# Patient Record
Sex: FEMALE | Race: BLACK OR AFRICAN AMERICAN | NOT HISPANIC OR LATINO | Employment: OTHER | URBAN - METROPOLITAN AREA
[De-identification: names, ages, dates, MRNs, and addresses within clinical notes are randomized per-mention and may not be internally consistent; named-entity substitution may affect disease eponyms.]

---

## 2023-04-10 ENCOUNTER — TELEPHONE (OUTPATIENT)
Dept: PRIMARY CARE | Facility: CLINIC | Age: 68
End: 2023-04-10
Payer: COMMERCIAL

## 2023-04-10 DIAGNOSIS — I10 PRIMARY HYPERTENSION: Primary | ICD-10-CM

## 2023-04-10 RX ORDER — LORATADINE 10 MG/1
1 TABLET ORAL DAILY
COMMUNITY
Start: 2021-02-23 | End: 2023-04-25 | Stop reason: SDUPTHER

## 2023-04-10 RX ORDER — AMITRIPTYLINE HYDROCHLORIDE 25 MG/1
TABLET, FILM COATED ORAL
COMMUNITY
Start: 2016-02-10

## 2023-04-10 RX ORDER — OXYCODONE AND ACETAMINOPHEN 5; 325 MG/1; MG/1
TABLET ORAL
COMMUNITY
Start: 2020-11-17 | End: 2023-04-25 | Stop reason: ALTCHOICE

## 2023-04-10 RX ORDER — DIAZEPAM 5 MG/1
5 TABLET ORAL NIGHTLY PRN
COMMUNITY

## 2023-04-10 RX ORDER — AMLODIPINE BESYLATE 5 MG/1
5 TABLET ORAL DAILY
Qty: 30 TABLET | Refills: 0 | Status: SHIPPED | OUTPATIENT
Start: 2023-04-10 | End: 2023-04-25 | Stop reason: SDUPTHER

## 2023-04-10 RX ORDER — DIAZEPAM 2 MG/1
TABLET ORAL
COMMUNITY
Start: 2014-09-12 | End: 2023-10-10 | Stop reason: SDUPTHER

## 2023-04-10 RX ORDER — ATENOLOL AND CHLORTHALIDONE TABLET 50; 25 MG/1; MG/1
1 TABLET ORAL DAILY
COMMUNITY
Start: 2020-08-03 | End: 2023-04-25 | Stop reason: SDUPTHER

## 2023-04-10 RX ORDER — AMLODIPINE BESYLATE 5 MG/1
1 TABLET ORAL DAILY
COMMUNITY
Start: 2022-04-22 | End: 2023-04-10 | Stop reason: SDUPTHER

## 2023-04-10 RX ORDER — CALCIUM CARBONATE 600 MG
TABLET ORAL
COMMUNITY
End: 2023-10-10 | Stop reason: ALTCHOICE

## 2023-04-10 RX ORDER — MULTIVIT,IRON,MINERALS/LUTEIN
1 TABLET ORAL DAILY
COMMUNITY

## 2023-04-11 LAB
ALANINE AMINOTRANSFERASE (SGPT) (U/L) IN SER/PLAS: 52 U/L (ref 7–45)
ALBUMIN (G/DL) IN SER/PLAS: 4.3 G/DL (ref 3.4–5)
ALKALINE PHOSPHATASE (U/L) IN SER/PLAS: 69 U/L (ref 33–136)
ANION GAP IN SER/PLAS: 14 MMOL/L (ref 10–20)
ASPARTATE AMINOTRANSFERASE (SGOT) (U/L) IN SER/PLAS: 57 U/L (ref 9–39)
BASOPHILS (10*3/UL) IN BLOOD BY AUTOMATED COUNT: 0.03 X10E9/L (ref 0–0.1)
BASOPHILS/100 LEUKOCYTES IN BLOOD BY AUTOMATED COUNT: 1.2 % (ref 0–2)
BILIRUBIN TOTAL (MG/DL) IN SER/PLAS: 0.4 MG/DL (ref 0–1.2)
CALCIUM (MG/DL) IN SER/PLAS: 9.4 MG/DL (ref 8.6–10.6)
CARBON DIOXIDE, TOTAL (MMOL/L) IN SER/PLAS: 30 MMOL/L (ref 21–32)
CHLORIDE (MMOL/L) IN SER/PLAS: 99 MMOL/L (ref 98–107)
CHOLESTEROL (MG/DL) IN SER/PLAS: 249 MG/DL (ref 0–199)
CHOLESTEROL IN HDL (MG/DL) IN SER/PLAS: 105.8 MG/DL
CHOLESTEROL/HDL RATIO: 2.4
CREATININE (MG/DL) IN SER/PLAS: 0.84 MG/DL (ref 0.5–1.05)
EOSINOPHILS (10*3/UL) IN BLOOD BY AUTOMATED COUNT: 0.02 X10E9/L (ref 0–0.7)
EOSINOPHILS/100 LEUKOCYTES IN BLOOD BY AUTOMATED COUNT: 0.8 % (ref 0–6)
ERYTHROCYTE DISTRIBUTION WIDTH (RATIO) BY AUTOMATED COUNT: 12 % (ref 11.5–14.5)
ERYTHROCYTE MEAN CORPUSCULAR HEMOGLOBIN CONCENTRATION (G/DL) BY AUTOMATED: 32.6 G/DL (ref 32–36)
ERYTHROCYTE MEAN CORPUSCULAR VOLUME (FL) BY AUTOMATED COUNT: 98 FL (ref 80–100)
ERYTHROCYTES (10*6/UL) IN BLOOD BY AUTOMATED COUNT: 3.85 X10E12/L (ref 4–5.2)
FERRITIN (UG/LL) IN SER/PLAS: 97 UG/L (ref 8–150)
GFR FEMALE: 75 ML/MIN/1.73M2
GLUCOSE (MG/DL) IN SER/PLAS: 115 MG/DL (ref 74–99)
HEMATOCRIT (%) IN BLOOD BY AUTOMATED COUNT: 37.7 % (ref 36–46)
HEMOGLOBIN (G/DL) IN BLOOD: 12.3 G/DL (ref 12–16)
IMMATURE GRANULOCYTES/100 LEUKOCYTES IN BLOOD BY AUTOMATED COUNT: 0.4 % (ref 0–0.9)
LDL: 128 MG/DL (ref 0–99)
LEUKOCYTES (10*3/UL) IN BLOOD BY AUTOMATED COUNT: 2.5 X10E9/L (ref 4.4–11.3)
LYMPHOCYTES (10*3/UL) IN BLOOD BY AUTOMATED COUNT: 0.81 X10E9/L (ref 1.2–4.8)
LYMPHOCYTES/100 LEUKOCYTES IN BLOOD BY AUTOMATED COUNT: 32.5 % (ref 13–44)
MONOCYTES (10*3/UL) IN BLOOD BY AUTOMATED COUNT: 0.25 X10E9/L (ref 0.1–1)
MONOCYTES/100 LEUKOCYTES IN BLOOD BY AUTOMATED COUNT: 10 % (ref 2–10)
NEUTROPHILS (10*3/UL) IN BLOOD BY AUTOMATED COUNT: 1.37 X10E9/L (ref 1.2–7.7)
NEUTROPHILS/100 LEUKOCYTES IN BLOOD BY AUTOMATED COUNT: 55.1 % (ref 40–80)
NRBC (PER 100 WBCS) BY AUTOMATED COUNT: 0 /100 WBC (ref 0–0)
PLATELETS (10*3/UL) IN BLOOD AUTOMATED COUNT: 314 X10E9/L (ref 150–450)
POTASSIUM (MMOL/L) IN SER/PLAS: 4.4 MMOL/L (ref 3.5–5.3)
PROTEIN TOTAL: 7.2 G/DL (ref 6.4–8.2)
SODIUM (MMOL/L) IN SER/PLAS: 139 MMOL/L (ref 136–145)
TRIGLYCERIDE (MG/DL) IN SER/PLAS: 75 MG/DL (ref 0–149)
UREA NITROGEN (MG/DL) IN SER/PLAS: 16 MG/DL (ref 6–23)
VLDL: 15 MG/DL (ref 0–40)

## 2023-04-12 ENCOUNTER — TELEPHONE (OUTPATIENT)
Dept: PRIMARY CARE | Facility: CLINIC | Age: 68
End: 2023-04-12
Payer: COMMERCIAL

## 2023-04-12 NOTE — TELEPHONE ENCOUNTER
----- Message from Rik Short MD sent at 4/11/2023  6:12 PM EDT -----  Low WBC is within her baseline. Liver enzymes are a little higher, should be avoiding alcohol. Other labs look good.   ----- Message -----  From: [facility] - Lab Results In  Sent: 4/11/2023  12:52 PM EDT  To: Rik Short MD

## 2023-04-25 ENCOUNTER — LAB (OUTPATIENT)
Dept: LAB | Facility: LAB | Age: 68
End: 2023-04-25
Payer: COMMERCIAL

## 2023-04-25 ENCOUNTER — OFFICE VISIT (OUTPATIENT)
Dept: PRIMARY CARE | Facility: CLINIC | Age: 68
End: 2023-04-25
Payer: COMMERCIAL

## 2023-04-25 VITALS
DIASTOLIC BLOOD PRESSURE: 66 MMHG | BODY MASS INDEX: 19.29 KG/M2 | HEART RATE: 76 BPM | SYSTOLIC BLOOD PRESSURE: 118 MMHG | RESPIRATION RATE: 12 BRPM | TEMPERATURE: 97.5 F | WEIGHT: 120 LBS | HEIGHT: 66 IN

## 2023-04-25 DIAGNOSIS — E78.00 ELEVATED LDL CHOLESTEROL LEVEL: ICD-10-CM

## 2023-04-25 DIAGNOSIS — D50.9 IRON DEFICIENCY ANEMIA, UNSPECIFIED IRON DEFICIENCY ANEMIA TYPE: ICD-10-CM

## 2023-04-25 DIAGNOSIS — Z00.00 HEALTHCARE MAINTENANCE: ICD-10-CM

## 2023-04-25 DIAGNOSIS — D72.819 LEUKOPENIA, UNSPECIFIED TYPE: ICD-10-CM

## 2023-04-25 DIAGNOSIS — M79.672 BILATERAL FOOT PAIN: ICD-10-CM

## 2023-04-25 DIAGNOSIS — F33.41 RECURRENT MAJOR DEPRESSIVE DISORDER, IN PARTIAL REMISSION (CMS-HCC): ICD-10-CM

## 2023-04-25 DIAGNOSIS — J30.9 ALLERGIC RHINITIS, UNSPECIFIED SEASONALITY, UNSPECIFIED TRIGGER: ICD-10-CM

## 2023-04-25 DIAGNOSIS — I51.9 DIASTOLIC DYSFUNCTION, LEFT VENTRICLE: ICD-10-CM

## 2023-04-25 DIAGNOSIS — R74.01 ELEVATED AST (SGOT): ICD-10-CM

## 2023-04-25 DIAGNOSIS — M79.671 BILATERAL FOOT PAIN: ICD-10-CM

## 2023-04-25 DIAGNOSIS — I10 PRIMARY HYPERTENSION: Primary | ICD-10-CM

## 2023-04-25 PROBLEM — M25.551 HIP PAIN, CHRONIC, RIGHT: Status: ACTIVE | Noted: 2023-04-25

## 2023-04-25 PROBLEM — L50.8 ACUTE URTICARIA: Status: ACTIVE | Noted: 2023-04-25

## 2023-04-25 PROBLEM — L50.8 ACUTE URTICARIA: Status: RESOLVED | Noted: 2023-04-25 | Resolved: 2023-04-25

## 2023-04-25 PROBLEM — G89.29 CHRONIC PAIN OF BOTH KNEES: Status: ACTIVE | Noted: 2023-04-25

## 2023-04-25 PROBLEM — G89.29 HIP PAIN, CHRONIC, RIGHT: Status: ACTIVE | Noted: 2023-04-25

## 2023-04-25 PROBLEM — H10.30 ACUTE CONJUNCTIVITIS: Status: ACTIVE | Noted: 2023-04-25

## 2023-04-25 PROBLEM — M25.562 CHRONIC PAIN OF BOTH KNEES: Status: ACTIVE | Noted: 2023-04-25

## 2023-04-25 PROBLEM — M85.852 OSTEOPENIA OF LEFT HIP: Status: ACTIVE | Noted: 2023-04-25

## 2023-04-25 PROBLEM — G47.00 INSOMNIA: Status: ACTIVE | Noted: 2023-04-25

## 2023-04-25 PROBLEM — Z96.643 AFTERCARE FOLLOWING BILATERAL HIP JOINT REPLACEMENT SURGERY: Status: ACTIVE | Noted: 2020-11-17

## 2023-04-25 PROBLEM — M15.4 EROSIVE OSTEOARTHRITIS OF HAND: Status: ACTIVE | Noted: 2023-04-25

## 2023-04-25 PROBLEM — J06.9 ACUTE URI: Status: RESOLVED | Noted: 2023-04-25 | Resolved: 2023-04-25

## 2023-04-25 PROBLEM — Z47.1 AFTERCARE FOLLOWING BILATERAL HIP JOINT REPLACEMENT SURGERY: Status: ACTIVE | Noted: 2020-11-17

## 2023-04-25 PROBLEM — M25.561 CHRONIC PAIN OF BOTH KNEES: Status: ACTIVE | Noted: 2023-04-25

## 2023-04-25 PROBLEM — D64.9 ANEMIA: Status: ACTIVE | Noted: 2020-01-01

## 2023-04-25 PROBLEM — B37.9 YEAST INFECTION: Status: RESOLVED | Noted: 2023-04-25 | Resolved: 2023-04-25

## 2023-04-25 PROBLEM — B37.9 YEAST INFECTION: Status: ACTIVE | Noted: 2023-04-25

## 2023-04-25 PROBLEM — F98.8 ADD (ATTENTION DEFICIT DISORDER): Status: ACTIVE | Noted: 2023-04-25

## 2023-04-25 PROBLEM — M25.512 CHRONIC LEFT SHOULDER PAIN: Status: ACTIVE | Noted: 2023-04-25

## 2023-04-25 PROBLEM — G89.29 CHRONIC LEFT SHOULDER PAIN: Status: ACTIVE | Noted: 2023-04-25

## 2023-04-25 PROBLEM — Z96.60 HISTORY OF JOINT REPLACEMENT: Status: ACTIVE | Noted: 2023-04-25

## 2023-04-25 PROBLEM — J06.9 ACUTE URI: Status: ACTIVE | Noted: 2023-04-25

## 2023-04-25 PROBLEM — I83.93 VARICOSE VEINS OF BOTH LOWER EXTREMITIES: Status: ACTIVE | Noted: 2020-01-01

## 2023-04-25 PROBLEM — H10.30 ACUTE CONJUNCTIVITIS: Status: RESOLVED | Noted: 2023-04-25 | Resolved: 2023-04-25

## 2023-04-25 PROBLEM — K76.9 HEPATIC LESION: Status: ACTIVE | Noted: 2023-04-25

## 2023-04-25 PROBLEM — F33.9 MAJOR DEPRESSION, RECURRENT (CMS-HCC): Status: ACTIVE | Noted: 2020-01-01

## 2023-04-25 LAB
HEPATITIS A VIRUS IGM AB PRESENCE IN SER/PLAS BY IMMUNOASSAY: NONREACTIVE
HEPATITIS B VIRUS CORE IGM AB PRESENCE IN SER/PLAS BY IMMUNOASSY: NONREACTIVE
HEPATITIS B VIRUS SURFACE AG PRESENCE IN SERUM: NONREACTIVE
HEPATITIS C VIRUS AB PRESENCE IN SERUM: NONREACTIVE

## 2023-04-25 PROCEDURE — 99214 OFFICE O/P EST MOD 30 MIN: CPT | Performed by: FAMILY MEDICINE

## 2023-04-25 PROCEDURE — 3078F DIAST BP <80 MM HG: CPT | Performed by: FAMILY MEDICINE

## 2023-04-25 PROCEDURE — 80074 ACUTE HEPATITIS PANEL: CPT

## 2023-04-25 PROCEDURE — 3074F SYST BP LT 130 MM HG: CPT | Performed by: FAMILY MEDICINE

## 2023-04-25 PROCEDURE — 1036F TOBACCO NON-USER: CPT | Performed by: FAMILY MEDICINE

## 2023-04-25 PROCEDURE — 36415 COLL VENOUS BLD VENIPUNCTURE: CPT

## 2023-04-25 PROCEDURE — 1159F MED LIST DOCD IN RCRD: CPT | Performed by: FAMILY MEDICINE

## 2023-04-25 RX ORDER — AMLODIPINE BESYLATE 5 MG/1
5 TABLET ORAL DAILY
Qty: 90 TABLET | Refills: 1 | Status: SHIPPED | OUTPATIENT
Start: 2023-04-25 | End: 2023-05-15 | Stop reason: SDUPTHER

## 2023-04-25 RX ORDER — FERROUS SULFATE 325(65) MG
65 TABLET ORAL EVERY OTHER DAY
COMMUNITY

## 2023-04-25 RX ORDER — LORATADINE 10 MG/1
10 TABLET ORAL DAILY
Qty: 90 TABLET | Refills: 1 | Status: SHIPPED | OUTPATIENT
Start: 2023-04-25 | End: 2024-06-03 | Stop reason: SDUPTHER

## 2023-04-25 RX ORDER — METHYLPHENIDATE HYDROCHLORIDE 20 MG/1
20 TABLET ORAL 3 TIMES DAILY
COMMUNITY
Start: 2020-09-01

## 2023-04-25 RX ORDER — ATENOLOL AND CHLORTHALIDONE TABLET 50; 25 MG/1; MG/1
1 TABLET ORAL DAILY
Qty: 90 TABLET | Refills: 1 | Status: SHIPPED | OUTPATIENT
Start: 2023-04-25 | End: 2023-10-10 | Stop reason: SDUPTHER

## 2023-04-25 RX ORDER — DEXTROMETHORPHAN HYDROBROMIDE, GUAIFENESIN 5; 100 MG/5ML; MG/5ML
650 LIQUID ORAL EVERY 8 HOURS PRN
COMMUNITY

## 2023-04-25 ASSESSMENT — ENCOUNTER SYMPTOMS
ABDOMINAL PAIN: 0
LIGHT-HEADEDNESS: 0
CHEST TIGHTNESS: 0
CHILLS: 0
HEADACHES: 0
DYSURIA: 0
DYSPHORIC MOOD: 0
DIARRHEA: 0
SLEEP DISTURBANCE: 0
ARTHRALGIAS: 1
WHEEZING: 0
DIFFICULTY URINATING: 0
NAUSEA: 0
SHORTNESS OF BREATH: 0
MYALGIAS: 0
ADENOPATHY: 0
ABDOMINAL DISTENTION: 0
BRUISES/BLEEDS EASILY: 0
FEVER: 0
FATIGUE: 0
APPETITE CHANGE: 0
LOSS OF SENSATION IN FEET: 0
DEPRESSION: 1
OCCASIONAL FEELINGS OF UNSTEADINESS: 0
NERVOUS/ANXIOUS: 1
SINUS PRESSURE: 0

## 2023-04-25 ASSESSMENT — PATIENT HEALTH QUESTIONNAIRE - PHQ9
SUM OF ALL RESPONSES TO PHQ9 QUESTIONS 1 AND 2: 1
10. IF YOU CHECKED OFF ANY PROBLEMS, HOW DIFFICULT HAVE THESE PROBLEMS MADE IT FOR YOU TO DO YOUR WORK, TAKE CARE OF THINGS AT HOME, OR GET ALONG WITH OTHER PEOPLE: SOMEWHAT DIFFICULT
1. LITTLE INTEREST OR PLEASURE IN DOING THINGS: NOT AT ALL
2. FEELING DOWN, DEPRESSED OR HOPELESS: SEVERAL DAYS

## 2023-04-25 NOTE — PROGRESS NOTES
Subjective   Patient ID: Valeria Watkins is a 68 y.o. female who presents for Follow-up.  Pt has chronic HTN, diastolic dysfunction.  Pt is taking Amlo, [place]/Chlorthalidone. Tolerating well.  Exercising 5-7 days per week   Low sodium diet is usually being followed.   Is not monitoring home blood pressures.  Denies HA, vision changes or CP.     Pt has Dyslipidemia.   Lipid panel showed , persistently elevated.  Currently taking no medication.     Labs showed lower WBC at 2.5, previously in 3-4 range. Was seeing hematology until 2020 then hasn't followed up since. Iron deficiency anemia is stable with oral iron supp every other day.     For diffuse OA, arthralgias are stable with Tylenol 2-3x daily.    Pt has chronic and stable depression / anxiety.  Pt  has supportive family/friends.   Pt not seeing a counselor.   Taking Diazepam, Concerta and Elavil and it is helping.   Mood, energy, motivation, interests, sleep and appetite are adequate. Anxiety is stable.  Pt denies suicidal ideations.      For chronic and worsening b/l foot pain and calluses she is requesting referral to podiatry.          Review of Systems   Constitutional:  Negative for appetite change, chills, fatigue and fever.   HENT:  Negative for congestion, ear pain and sinus pressure.    Eyes:  Negative for visual disturbance.   Respiratory:  Negative for chest tightness, shortness of breath and wheezing.    Cardiovascular:  Negative for chest pain.   Gastrointestinal:  Negative for abdominal distention, abdominal pain, diarrhea and nausea.   Genitourinary:  Negative for difficulty urinating, dysuria and pelvic pain.   Musculoskeletal:  Positive for arthralgias. Negative for myalgias.   Skin:  Negative for rash.   Allergic/Immunologic: Negative for immunocompromised state.   Neurological:  Negative for light-headedness and headaches.   Hematological:  Negative for adenopathy. Does not bruise/bleed easily.   Psychiatric/Behavioral:  Negative for  "dysphoric mood and sleep disturbance. The patient is nervous/anxious.        Objective   /66   Pulse 76   Temp 36.4 °C (97.5 °F)   Resp 12   Ht 1.676 m (5' 6\")   Wt 54.4 kg (120 lb)   BMI 19.37 kg/m²    Physical Exam  Constitutional:       General: She is not in acute distress.     Appearance: Normal appearance.   Cardiovascular:      Rate and Rhythm: Normal rate and regular rhythm.      Heart sounds: Normal heart sounds. No murmur heard.  Pulmonary:      Effort: Pulmonary effort is normal.      Breath sounds: Normal breath sounds.   Abdominal:      Palpations: Abdomen is soft.      Tenderness: There is no abdominal tenderness.   Neurological:      Mental Status: She is alert.   Psychiatric:         Mood and Affect: Mood normal.         Judgment: Judgment normal.           Assessment/Plan   Diagnoses and all orders for this visit:  Primary hypertension / Diastolic dysfunction, left ventricle- stable, continue current meds and monitor  Elevated AST (SGOT) - recommend avoid alcohol, keep Tylenol to lowest effective dose. Will check hepatitis panel with labs.  Elevated LDL cholesterol level - will check CAC score and monitor  Leukopenia, unspecified type - WBC lower, referring back to Dr Maldonado  Iron deficiency anemia, unspecified iron deficiency anemia type - stable, continue iron at current dose  Recurrent major depressive disorder, in partial remission (CMS/HCC) -stable, continue to follow with Dr Siu  Foot pain - referring to Podiatry  Follow up in 6 months, 30min for preventative     "

## 2023-04-25 NOTE — PROGRESS NOTES
"Subjective   Patient ID: Valeria Watkins is a 68 y.o. female who presents for Follow-up.    HPI     Review of Systems    Objective   /66   Pulse 76   Temp 36.4 °C (97.5 °F)   Resp 12   Ht 1.676 m (5' 6\")   Wt 54.4 kg (120 lb)   BMI 19.37 kg/m²     Physical Exam    Assessment/Plan          "

## 2023-04-26 ENCOUNTER — TELEPHONE (OUTPATIENT)
Dept: PRIMARY CARE | Facility: CLINIC | Age: 68
End: 2023-04-26
Payer: COMMERCIAL

## 2023-04-26 NOTE — TELEPHONE ENCOUNTER
----- Message from Rik Short MD sent at 4/25/2023  5:18 PM EDT -----  Hepatitis screen is negative  ----- Message -----  From: Lab, Background User  Sent: 4/25/2023   4:39 PM EDT  To: Rik Short MD

## 2023-04-27 DIAGNOSIS — E78.00 ELEVATED LDL CHOLESTEROL LEVEL: ICD-10-CM

## 2023-04-27 DIAGNOSIS — I25.10 CORONARY ARTERY DISEASE INVOLVING NATIVE CORONARY ARTERY OF NATIVE HEART WITHOUT ANGINA PECTORIS: Primary | ICD-10-CM

## 2023-04-27 RX ORDER — ASPIRIN 81 MG/1
81 TABLET ORAL DAILY
Qty: 90 TABLET | Refills: 3 | Status: SHIPPED | OUTPATIENT
Start: 2023-04-27 | End: 2024-04-26

## 2023-04-27 RX ORDER — ROSUVASTATIN CALCIUM 10 MG/1
10 TABLET, COATED ORAL DAILY
Qty: 90 TABLET | Refills: 1 | Status: SHIPPED | OUTPATIENT
Start: 2023-04-27 | End: 2023-10-10 | Stop reason: SDUPTHER

## 2023-04-28 ENCOUNTER — TELEPHONE (OUTPATIENT)
Dept: PRIMARY CARE | Facility: CLINIC | Age: 68
End: 2023-04-28
Payer: COMMERCIAL

## 2023-04-28 NOTE — TELEPHONE ENCOUNTER
----- Message from Rik Short MD sent at 4/27/2023  9:06 PM EDT -----  Coronary artery calcium score is high which does indicate a higher risk of CV disease (heart attack, stroke). Recommend healthy diet, regular exercise and maintaining healthy blood pressure and cholesterol levels. Additionally will have her start Aspirin 81mg once daily and Rosuvastatin 10mg once daily ( preferrably in the evening). I she develops any symptoms of CP or SOB would consider referral to establish with cardiology.  ----- Message -----  From: [facility] - Radiology Results In  Sent: 4/27/2023   9:24 AM EDT  To: Rik Short MD

## 2023-05-15 DIAGNOSIS — I10 PRIMARY HYPERTENSION: ICD-10-CM

## 2023-05-16 RX ORDER — AMLODIPINE BESYLATE 5 MG/1
5 TABLET ORAL DAILY
Qty: 90 TABLET | Refills: 1 | Status: SHIPPED | OUTPATIENT
Start: 2023-05-16 | End: 2023-10-10 | Stop reason: SDUPTHER

## 2023-05-18 ENCOUNTER — APPOINTMENT (OUTPATIENT)
Dept: PRIMARY CARE | Facility: CLINIC | Age: 68
End: 2023-05-18
Payer: COMMERCIAL

## 2023-09-01 PROBLEM — G89.29 CHRONIC FOOT PAIN, LEFT: Status: ACTIVE | Noted: 2023-09-01

## 2023-09-01 PROBLEM — M79.672 CHRONIC FOOT PAIN, LEFT: Status: ACTIVE | Noted: 2023-09-01

## 2023-09-01 PROBLEM — L30.9 DERMATITIS, UNSPECIFIED: Status: ACTIVE | Noted: 2020-02-05

## 2023-09-01 PROBLEM — M21.41 ACQUIRED PES PLANUS OF BOTH FEET: Status: ACTIVE | Noted: 2023-09-01

## 2023-09-01 PROBLEM — M20.10 HALLUX ABDUCTOVALGUS WITH BUNIONS: Status: ACTIVE | Noted: 2023-09-01

## 2023-09-01 PROBLEM — M21.619 HALLUX ABDUCTOVALGUS WITH BUNIONS: Status: ACTIVE | Noted: 2023-09-01

## 2023-09-01 PROBLEM — L85.9 EPIDERMAL THICKENING, UNSPECIFIED: Status: ACTIVE | Noted: 2020-02-05

## 2023-09-01 PROBLEM — M21.42 ACQUIRED PES PLANUS OF BOTH FEET: Status: ACTIVE | Noted: 2023-09-01

## 2023-09-01 RX ORDER — FLUOXETINE HYDROCHLORIDE 20 MG/1
20 CAPSULE ORAL
COMMUNITY
End: 2023-10-10 | Stop reason: ALTCHOICE

## 2023-09-01 RX ORDER — CHLORHEXIDINE GLUCONATE ORAL RINSE 1.2 MG/ML
SOLUTION DENTAL
COMMUNITY
Start: 2016-11-15 | End: 2023-10-10 | Stop reason: ALTCHOICE

## 2023-09-01 RX ORDER — OLANZAPINE 10 MG/1
1 TABLET ORAL NIGHTLY
COMMUNITY
Start: 2016-04-04 | End: 2023-10-10 | Stop reason: ALTCHOICE

## 2023-09-01 RX ORDER — OXYCODONE AND ACETAMINOPHEN 10; 325 MG/1; MG/1
1 TABLET ORAL EVERY 6 HOURS PRN
COMMUNITY
Start: 2015-11-30 | End: 2023-10-10 | Stop reason: ALTCHOICE

## 2023-09-01 RX ORDER — TRIAMCINOLONE ACETONIDE 1 MG/G
CREAM TOPICAL 2 TIMES DAILY
COMMUNITY
Start: 2014-09-12 | End: 2023-10-10 | Stop reason: ALTCHOICE

## 2023-09-01 RX ORDER — LANOLIN ALCOHOL/MO/W.PET/CERES
100 CREAM (GRAM) TOPICAL 3 TIMES DAILY
COMMUNITY
Start: 2016-04-05 | End: 2023-10-10 | Stop reason: ALTCHOICE

## 2023-09-01 RX ORDER — CYCLOBENZAPRINE HCL 10 MG
10 TABLET ORAL 3 TIMES DAILY PRN
COMMUNITY
End: 2023-10-10 | Stop reason: ALTCHOICE

## 2023-09-01 RX ORDER — FLUOXETINE HYDROCHLORIDE 40 MG/1
40 CAPSULE ORAL
COMMUNITY
Start: 2016-04-05 | End: 2023-10-10 | Stop reason: ALTCHOICE

## 2023-09-01 RX ORDER — HYDROCHLOROTHIAZIDE 25 MG/1
25 TABLET ORAL
COMMUNITY
Start: 2016-04-05 | End: 2023-10-10 | Stop reason: ALTCHOICE

## 2023-09-01 RX ORDER — OXYCODONE AND ACETAMINOPHEN 5; 325 MG/1; MG/1
1 TABLET ORAL EVERY 6 HOURS
COMMUNITY
Start: 2020-11-18 | End: 2023-10-10 | Stop reason: ALTCHOICE

## 2023-09-01 RX ORDER — IBUPROFEN 600 MG/1
600 TABLET ORAL EVERY 6 HOURS PRN
COMMUNITY
Start: 2015-11-30 | End: 2023-10-10 | Stop reason: ALTCHOICE

## 2023-09-01 RX ORDER — NAPROXEN SODIUM 550 MG/1
550 TABLET ORAL EVERY 12 HOURS PRN
COMMUNITY
Start: 2015-12-29 | End: 2023-10-10 | Stop reason: ALTCHOICE

## 2023-10-02 ASSESSMENT — PATIENT HEALTH QUESTIONNAIRE - PHQ9
3. TROUBLE FALLING OR STAYING ASLEEP OR SLEEPING TOO MUCH: NOT AT ALL
2. FEELING DOWN, DEPRESSED, IRRITABLE, OR HOPELESS: 1
7. TROUBLE CONCENTRATING ON THINGS, SUCH AS READING THE NEWSPAPER OR WATCHING TELEVISION: NOT AT ALL
6. FEELING BAD ABOUT YOURSELF - OR THAT YOU ARE A FAILURE OR HAVE LET YOURSELF OR YOUR FAMILY DOWN: SEVERAL DAYS
6. FEELING BAD ABOUT YOURSELF - OR THAT YOU ARE A FAILURE OR HAVE LET YOURSELF OR YOUR FAMILY DOWN: 1
3. TROUBLE FALLING OR STAYING ASLEEP OR SLEEPING TOO MUCH: 0
9. THOUGHTS THAT YOU WOULD BE BETTER OFF DEAD, OR OF HURTING YOURSELF: 0
5. POOR APPETITE OR OVEREATING: SEVERAL DAYS
8. MOVING OR SPEAKING SO SLOWLY THAT OTHER PEOPLE COULD HAVE NOTICED. OR THE OPPOSITE, BEING SO FIGETY OR RESTLESS THAT YOU HAVE BEEN MOVING AROUND A LOT MORE THAN USUAL: 0
1. LITTLE INTEREST OR PLEASURE IN DOING THINGS: NOT AT ALL
SUM OF ALL RESPONSES TO PHQ QUESTIONS 1-9: 4
SUM OF ALL RESPONSES TO PHQ QUESTIONS 1-9: 4
3. TROUBLE FALLING OR STAYING ASLEEP OR SLEEPING TOO MUCH: NOT AT ALL
7. TROUBLE CONCENTRATING ON THINGS, SUCH AS READING THE NEWSPAPER OR WATCHING TELEVISION: 0
9. THOUGHTS THAT YOU WOULD BE BETTER OFF DEAD, OR OF HURTING YOURSELF: NOT AT ALL
2. FEELING DOWN, DEPRESSED, IRRITABLE, OR HOPELESS: SEVERAL DAYS
2. FEELING DOWN, DEPRESSED OR HOPELESS: SEVERAL DAYS
4. FEELING TIRED OR HAVING LITTLE ENERGY: 1
5. POOR APPETITE OR OVEREATING: 1
1. LITTLE INTEREST OR PLEASURE IN DOING THINGS: NOT AT ALL
10. IF YOU CHECKED OFF ANY PROBLEMS, HOW DIFFICULT HAVE THESE PROBLEMS MADE IT FOR YOU TO DO YOUR WORK, TAKE CARE OF THINGS AT HOME, OR GET ALONG WITH OTHER PEOPLE: NOT DIFFICULT AT ALL
8. MOVING OR SPEAKING SO SLOWLY THAT OTHER PEOPLE COULD HAVE NOTICED. OR THE OPPOSITE, BEING SO FIGETY OR RESTLESS THAT YOU HAVE BEEN MOVING AROUND A LOT MORE THAN USUAL: NOT AT ALL
5. POOR APPETITE OR OVEREATING: SEVERAL DAYS
7. TROUBLE CONCENTRATING ON THINGS, SUCH AS READING THE NEWSPAPER OR WATCHING TELEVISION: NOT AT ALL
4. FEELING TIRED OR HAVING LITTLE ENERGY: SEVERAL DAYS
6. FEELING BAD ABOUT YOURSELF - OR THAT YOU ARE A FAILURE OR HAVE LET YOURSELF OR YOUR FAMILY DOWN: SEVERAL DAYS
9. THOUGHTS THAT YOU WOULD BE BETTER OFF DEAD, OR OF HURTING YOURSELF: NOT AT ALL
1. LITTLE INTEREST OR PLEASURE IN DOING THINGS: 0
10. IF YOU CHECKED OFF ANY PROBLEMS, HOW DIFFICULT HAVE THESE PROBLEMS MADE IT FOR YOU TO DO YOUR WORK, TAKE CARE OF THINGS AT HOME, OR GET ALONG WITH OTHER PEOPLE: NOT DIFFICULT AT ALL
4. FEELING TIRED OR HAVING LITTLE ENERGY: SEVERAL DAYS
8. MOVING OR SPEAKING SO SLOWLY THAT OTHER PEOPLE COULD HAVE NOTICED. OR THE OPPOSITE, BEING SO FIGETY OR RESTLESS THAT YOU HAVE BEEN MOVING AROUND A LOT MORE THAN USUAL: NOT AT ALL

## 2023-10-06 ENCOUNTER — TELEPHONE (OUTPATIENT)
Dept: PRIMARY CARE | Facility: CLINIC | Age: 68
End: 2023-10-06
Payer: COMMERCIAL

## 2023-10-06 ENCOUNTER — LAB (OUTPATIENT)
Dept: LAB | Facility: LAB | Age: 68
End: 2023-10-06
Payer: COMMERCIAL

## 2023-10-06 DIAGNOSIS — R79.89 ELEVATED LFTS: ICD-10-CM

## 2023-10-06 DIAGNOSIS — E78.00 ELEVATED LDL CHOLESTEROL LEVEL: ICD-10-CM

## 2023-10-06 DIAGNOSIS — I10 PRIMARY HYPERTENSION: ICD-10-CM

## 2023-10-06 DIAGNOSIS — E78.00 PURE HYPERCHOLESTEROLEMIA, UNSPECIFIED: ICD-10-CM

## 2023-10-06 DIAGNOSIS — Z00.00 HEALTHCARE MAINTENANCE: ICD-10-CM

## 2023-10-06 DIAGNOSIS — R73.01 IFG (IMPAIRED FASTING GLUCOSE): ICD-10-CM

## 2023-10-06 DIAGNOSIS — D50.9 IRON DEFICIENCY ANEMIA, UNSPECIFIED IRON DEFICIENCY ANEMIA TYPE: ICD-10-CM

## 2023-10-06 DIAGNOSIS — I10 PRIMARY HYPERTENSION: Primary | ICD-10-CM

## 2023-10-06 LAB
ALBUMIN SERPL BCP-MCNC: 4.6 G/DL (ref 3.4–5)
ALP SERPL-CCNC: 89 U/L (ref 33–136)
ALT SERPL W P-5'-P-CCNC: 71 U/L (ref 7–45)
ANION GAP SERPL CALC-SCNC: 16 MMOL/L (ref 10–20)
APPEARANCE UR: CLEAR
AST SERPL W P-5'-P-CCNC: 74 U/L (ref 9–39)
BILIRUB SERPL-MCNC: 0.5 MG/DL (ref 0–1.2)
BILIRUB UR STRIP.AUTO-MCNC: NEGATIVE MG/DL
BUN SERPL-MCNC: 8 MG/DL (ref 6–23)
CALCIUM SERPL-MCNC: 11 MG/DL (ref 8.6–10.6)
CHLORIDE SERPL-SCNC: 100 MMOL/L (ref 98–107)
CHOLEST SERPL-MCNC: 197 MG/DL (ref 0–199)
CHOLESTEROL/HDL RATIO: 2.2
CO2 SERPL-SCNC: 31 MMOL/L (ref 21–32)
COLOR UR: YELLOW
CREAT SERPL-MCNC: 0.73 MG/DL (ref 0.5–1.05)
ERYTHROCYTE [DISTWIDTH] IN BLOOD BY AUTOMATED COUNT: 11.6 % (ref 11.5–14.5)
EST. AVERAGE GLUCOSE BLD GHB EST-MCNC: 105 MG/DL
FERRITIN SERPL-MCNC: 130 NG/ML (ref 8–150)
FOLATE SERPL-MCNC: >24 NG/ML
GFR SERPL CREATININE-BSD FRML MDRD: 89 ML/MIN/1.73M*2
GGT SERPL-CCNC: 85 U/L (ref 5–55)
GLUCOSE SERPL-MCNC: 82 MG/DL (ref 74–99)
GLUCOSE UR STRIP.AUTO-MCNC: NEGATIVE MG/DL
HBA1C MFR BLD: 5.3 %
HCT VFR BLD AUTO: 39.5 % (ref 36–46)
HCV AB SER QL: NONREACTIVE
HDLC SERPL-MCNC: 89.4 MG/DL
HGB BLD-MCNC: 12.6 G/DL (ref 12–16)
IRON SATN MFR SERPL: 10 % (ref 25–45)
IRON SERPL-MCNC: 40 UG/DL (ref 35–150)
KETONES UR STRIP.AUTO-MCNC: NEGATIVE MG/DL
LDLC SERPL CALC-MCNC: 89 MG/DL (ref 140–190)
LEUKOCYTE ESTERASE UR QL STRIP.AUTO: NEGATIVE
MCH RBC QN AUTO: 31.4 PG (ref 26–34)
MCHC RBC AUTO-ENTMCNC: 31.9 G/DL (ref 32–36)
MCV RBC AUTO: 99 FL (ref 80–100)
NITRITE UR QL STRIP.AUTO: NEGATIVE
NON HDL CHOLESTEROL: 108 MG/DL (ref 0–149)
NRBC BLD-RTO: 0 /100 WBCS (ref 0–0)
PH UR STRIP.AUTO: 8 [PH]
PLATELET # BLD AUTO: 344 X10*3/UL (ref 150–450)
PMV BLD AUTO: 10 FL (ref 7.5–11.5)
POTASSIUM SERPL-SCNC: 3.8 MMOL/L (ref 3.5–5.3)
PROT SERPL-MCNC: 7.4 G/DL (ref 6.4–8.2)
PROT UR STRIP.AUTO-MCNC: NEGATIVE MG/DL
RBC # BLD AUTO: 4.01 X10*6/UL (ref 4–5.2)
RBC # UR STRIP.AUTO: NEGATIVE /UL
SODIUM SERPL-SCNC: 143 MMOL/L (ref 136–145)
SP GR UR STRIP.AUTO: 1
TIBC SERPL-MCNC: 399 UG/DL (ref 240–445)
TRIGL SERPL-MCNC: 92 MG/DL (ref 0–149)
UIBC SERPL-MCNC: 359 UG/DL (ref 110–370)
UROBILINOGEN UR STRIP.AUTO-MCNC: <2 MG/DL
VIT B12 SERPL-MCNC: 1323 PG/ML (ref 211–911)
VLDL: 18 MG/DL (ref 0–40)
WBC # BLD AUTO: 3.7 X10*3/UL (ref 4.4–11.3)

## 2023-10-06 PROCEDURE — 80053 COMPREHEN METABOLIC PANEL: CPT

## 2023-10-06 PROCEDURE — 83036 HEMOGLOBIN GLYCOSYLATED A1C: CPT

## 2023-10-06 PROCEDURE — 82607 VITAMIN B-12: CPT

## 2023-10-06 PROCEDURE — 80061 LIPID PANEL: CPT

## 2023-10-06 PROCEDURE — 82977 ASSAY OF GGT: CPT

## 2023-10-06 PROCEDURE — 86803 HEPATITIS C AB TEST: CPT

## 2023-10-06 PROCEDURE — 85027 COMPLETE CBC AUTOMATED: CPT

## 2023-10-06 PROCEDURE — 83540 ASSAY OF IRON: CPT

## 2023-10-06 PROCEDURE — 81003 URINALYSIS AUTO W/O SCOPE: CPT

## 2023-10-06 PROCEDURE — 82746 ASSAY OF FOLIC ACID SERUM: CPT

## 2023-10-06 PROCEDURE — 83550 IRON BINDING TEST: CPT

## 2023-10-06 PROCEDURE — 36415 COLL VENOUS BLD VENIPUNCTURE: CPT

## 2023-10-06 PROCEDURE — 82728 ASSAY OF FERRITIN: CPT

## 2023-10-06 NOTE — TELEPHONE ENCOUNTER
Patient came into office, wants blood work order for upcoming office visit on 10/10/2023.  Patient wants to wait for order as she has already fasted.

## 2023-10-09 ENCOUNTER — OFFICE VISIT (OUTPATIENT)
Dept: HEMATOLOGY/ONCOLOGY | Facility: CLINIC | Age: 68
End: 2023-10-09
Payer: COMMERCIAL

## 2023-10-09 VITALS
WEIGHT: 120.15 LBS | BODY MASS INDEX: 20.51 KG/M2 | OXYGEN SATURATION: 100 % | DIASTOLIC BLOOD PRESSURE: 84 MMHG | HEART RATE: 83 BPM | SYSTOLIC BLOOD PRESSURE: 166 MMHG | RESPIRATION RATE: 18 BRPM | HEIGHT: 64 IN | TEMPERATURE: 97.5 F

## 2023-10-09 DIAGNOSIS — D70.8 OTHER NEUTROPENIA (CMS-HCC): Primary | ICD-10-CM

## 2023-10-09 LAB
BASOPHILS # BLD AUTO: 0.02 X10*3/UL (ref 0–0.1)
BASOPHILS NFR BLD AUTO: 0.7 %
EOSINOPHIL # BLD AUTO: 0.21 X10*3/UL (ref 0–0.7)
EOSINOPHIL NFR BLD AUTO: 7.4 %
ERYTHROCYTE [DISTWIDTH] IN BLOOD BY AUTOMATED COUNT: 11.4 % (ref 11.5–14.5)
HCT VFR BLD AUTO: 38.1 % (ref 36–46)
HGB BLD-MCNC: 12.6 G/DL (ref 12–16)
IMM GRANULOCYTES # BLD AUTO: 0 X10*3/UL (ref 0–0.7)
IMM GRANULOCYTES NFR BLD AUTO: 0 % (ref 0–0.9)
LYMPHOCYTES # BLD AUTO: 1.08 X10*3/UL (ref 1.2–4.8)
LYMPHOCYTES NFR BLD AUTO: 38 %
MCH RBC QN AUTO: 33.1 PG (ref 26–34)
MCHC RBC AUTO-ENTMCNC: 33.1 G/DL (ref 32–36)
MCV RBC AUTO: 100 FL (ref 80–100)
MONOCYTES # BLD AUTO: 0.27 X10*3/UL (ref 0.1–1)
MONOCYTES NFR BLD AUTO: 9.5 %
NEUTROPHILS # BLD AUTO: 1.26 X10*3/UL (ref 1.2–7.7)
NEUTROPHILS NFR BLD AUTO: 44.4 %
PLATELET # BLD AUTO: 288 X10*3/UL (ref 150–450)
PMV BLD AUTO: 8.9 FL (ref 7.5–11.5)
RBC # BLD AUTO: 3.81 X10*6/UL (ref 4–5.2)
WBC # BLD AUTO: 2.8 X10*3/UL (ref 4.4–11.3)

## 2023-10-09 PROCEDURE — 1036F TOBACCO NON-USER: CPT | Performed by: INTERNAL MEDICINE

## 2023-10-09 PROCEDURE — 85025 COMPLETE CBC W/AUTO DIFF WBC: CPT | Performed by: INTERNAL MEDICINE

## 2023-10-09 PROCEDURE — 1159F MED LIST DOCD IN RCRD: CPT | Performed by: INTERNAL MEDICINE

## 2023-10-09 PROCEDURE — 3079F DIAST BP 80-89 MM HG: CPT | Performed by: INTERNAL MEDICINE

## 2023-10-09 PROCEDURE — 99203 OFFICE O/P NEW LOW 30 MIN: CPT | Performed by: INTERNAL MEDICINE

## 2023-10-09 PROCEDURE — 1125F AMNT PAIN NOTED PAIN PRSNT: CPT | Performed by: INTERNAL MEDICINE

## 2023-10-09 PROCEDURE — 99213 OFFICE O/P EST LOW 20 MIN: CPT | Mod: 25 | Performed by: INTERNAL MEDICINE

## 2023-10-09 PROCEDURE — 3077F SYST BP >= 140 MM HG: CPT | Performed by: INTERNAL MEDICINE

## 2023-10-09 PROCEDURE — 36415 COLL VENOUS BLD VENIPUNCTURE: CPT | Performed by: INTERNAL MEDICINE

## 2023-10-09 ASSESSMENT — PAIN SCALES - GENERAL: PAINLEVEL: 7

## 2023-10-09 NOTE — PROGRESS NOTES
Patient Visit Information:   Visit Type: Follow Up Visit      History of Present Illness:      Chief Complaint: Normocytic anemia and  leukopenia   Interval History:    Patient is a 68-year-old female with a history of anemia, leukopenia, anxiety, depression,  ADD, hypertension, bilateral hip pain and right hip replacement, multiple  joint pain was complaining of weakness and fatigue.     Recent CBC done in October 2018 did show WBC count 2.3, RBC 3.6, hemoglobin 11.1, platelets 350.         In September 2015 WBC count of 3.5, hemoglobin 11.6, platelets 376 with the 31% neutrophils, 50% lymphocytes, 10% monocytes.     She has 2015 patient had fluctuating leukopenia and most of   time differential count within normal limits.  Patient also has mild anemia.  Patient was evaluated in the past because of leukopenia.  Initial work-up was negative including flow cytometry of peripheral blood.  B12 level, TSH within normal limit.  Rheumatoid factor was slightly elevated rheumatology evaluation done.  Clinically we thought most probably we are dealing benign chronic leukopenia.    Today patient again comes for follow-up visit.       Patient is complaining of weakness and fatigue.  She is working full-time.  At the end of today she is complaining of tiredness.   Denied any fever or night sweats.   Body weight is stable     She has chronic joint pain and bilateral hip pain due to diffuse osteoarthritis.     Sometimes he noticed to have easy bruisability and no history of bleeding and a blood clot.     She did not start the new medication.     Liver function and the kidney function within normal limits.     Patient has 3 pregnancies without any problem.     Review of Systems:   Review of Systems:    Complaining of weakness and fatigue     Sometimes she has  headache, no dizziness     She has photosensitivity     She is also complaining of dryness of oral cavity     No dysphagia      NO sternal pain     No chest pain and  shortness of breath.     No fever sometimes she has night sweats     No abdominal pain     No diarrhea and constipation     No vaginal discharge at this time     No dysuria and hematuria     Complaining of bilateral hip pain     She is DEPRESSED     History of weight loss and recently body weight is stable        Allergies and Intolerances:       Allergies:         NKDA: Active         Latex: Latex, Unknown, Active     Outpatient Medication Profile:  * Patient Currently Takes Medications as of 04-Mar-2019 11:52 documented in Structured Notes         Ritalin 20 mg oral tablet : Last Dose Taken:  , 1 tab(s) orally 3 times a day         PROzac 20 mg oral capsule: Last Dose Taken:  , 1 cap(s) orally 3 times  a day         Elavil 25 mg oral tablet: Last Dose Taken:  , 1 tab(s) orally once a day  (at bedtime)         diazePAM 10 mg oral tablet: Last Dose Taken:  , 1 tab(s) orally once a  day, As Needed             Medical History:         Leukopenia: Status: Active         Eczema: Status: Active         Depression: Status: Active         Anxiety: Status: Active         Joint pain: Status: Active         Osteoarthritis: Status: Active         ADD (attention deficit disorder): Status: Active         Hypertension: Status: Active         Anemia: Status: Active         Abdominal pain: Status: Active       Surg History:         Chronic hip pain after total replacement of right hip joint : Status: Active         Pre-op evaluation: Status: Active     Family History: No Family History  items are recorded in the problem list.         Social History:      Smoking Status: never smoker  (1)   Alcohol Use: denies (1)   Drug Use: denies  (1)   Additional History:    Patient has screening colonoscopy.     No mammographically(1)           Vitals and Measurements:   Vitals: Temp: 36.7  HR: 90  RR: 16   BP: 159/82  SPO2%:  NA   Measurements: HT(cm): 167  WT(kg): NA   BSA: NA  BMI:  NA   Last 3 Weights & Heights: Date:                         "           Weight/Scale Type:            Height:   04-Feb-2019 09:43            55.8  kg / standing scale                167  cm      Physical Exam:      Constitutional: awake/alert/oriented  x3, no distress,   Eyes: PERRL, EOMI, clear sclera   ENMT: mucous membranes moist,   Head/Neck: Neck supple,   Respiratory/Thorax: Patent airways,  CTAB, normal breath sounds   Cardiovascular: Regular, rate and rhythm,  no murmurs,  normal S 1and S 2   Gastrointestinal: Nondistended, soft,      +BS,  pulsatile mass is palpable in the epigastric pain   Musculoskeletal: ROM intact,  joint   swelling and some joint or deformity due to  OA   Extremities: normal extremities, no  cyanosis edema,   no clubbing   Neurological: alert and oriented x3,  intact senses, motor, response and reflexes,   Psychological: Appropriate mood and  behavior   Skin: Warm and dry,   no rashes, some  hypopigmented areas seen         Lab Results:     ·  Results                      Assessment and Plan:      Assessment and Plan:   Assessment:    #1 leukopenia     #2 normocytic anemia     #3 osteoarthritis     #4 ADD     #5 anxiety and depression     #6 joint pain and right hip pain                 Patient has a leukopenia since 2015.  The differential diagnoses include #1 chronic benign leukopenia #2 rule out underlying connective tissue disease    Today WBC count 2.8 and ANC is stable, most probably we are dealing chronic benign leukopenia no further work-up patient does not have a history of frequent infection continue to monitor clinically I will repeat CBC after 6 months.  Lab result discussed with patient.  Plan as above, follow-up after 4 months and check CBC and iron studies.     Plan discussed with patient.           Note Recipients: Rik Short MD - [phone]   Select \"Yes\" when ready to send to Provider(s) Listed Above:  Note sent to providers named above      "

## 2023-10-10 ENCOUNTER — TELEPHONE (OUTPATIENT)
Dept: PRIMARY CARE | Facility: CLINIC | Age: 68
End: 2023-10-10

## 2023-10-10 ENCOUNTER — OFFICE VISIT (OUTPATIENT)
Dept: PRIMARY CARE | Facility: CLINIC | Age: 68
End: 2023-10-10
Payer: COMMERCIAL

## 2023-10-10 VITALS
BODY MASS INDEX: 19.61 KG/M2 | DIASTOLIC BLOOD PRESSURE: 74 MMHG | HEIGHT: 66 IN | WEIGHT: 122 LBS | HEART RATE: 68 BPM | RESPIRATION RATE: 12 BRPM | SYSTOLIC BLOOD PRESSURE: 136 MMHG

## 2023-10-10 DIAGNOSIS — I10 PRIMARY HYPERTENSION: ICD-10-CM

## 2023-10-10 DIAGNOSIS — Z00.00 ROUTINE GENERAL MEDICAL EXAMINATION AT A HEALTH CARE FACILITY: Primary | ICD-10-CM

## 2023-10-10 DIAGNOSIS — E78.00 ELEVATED LDL CHOLESTEROL LEVEL: ICD-10-CM

## 2023-10-10 PROCEDURE — 3075F SYST BP GE 130 - 139MM HG: CPT | Performed by: FAMILY MEDICINE

## 2023-10-10 PROCEDURE — 3078F DIAST BP <80 MM HG: CPT | Performed by: FAMILY MEDICINE

## 2023-10-10 PROCEDURE — 1125F AMNT PAIN NOTED PAIN PRSNT: CPT | Performed by: FAMILY MEDICINE

## 2023-10-10 PROCEDURE — 1170F FXNL STATUS ASSESSED: CPT | Performed by: FAMILY MEDICINE

## 2023-10-10 PROCEDURE — 1159F MED LIST DOCD IN RCRD: CPT | Performed by: FAMILY MEDICINE

## 2023-10-10 PROCEDURE — G0439 PPPS, SUBSEQ VISIT: HCPCS | Performed by: FAMILY MEDICINE

## 2023-10-10 PROCEDURE — 1036F TOBACCO NON-USER: CPT | Performed by: FAMILY MEDICINE

## 2023-10-10 PROCEDURE — 1160F RVW MEDS BY RX/DR IN RCRD: CPT | Performed by: FAMILY MEDICINE

## 2023-10-10 RX ORDER — AMLODIPINE BESYLATE 5 MG/1
5 TABLET ORAL DAILY
Qty: 90 TABLET | Refills: 0 | Status: SHIPPED | OUTPATIENT
Start: 2023-10-10 | End: 2024-03-04 | Stop reason: SDUPTHER

## 2023-10-10 RX ORDER — AMOXICILLIN 500 MG/1
2000 TABLET, FILM COATED ORAL AS NEEDED
COMMUNITY
End: 2024-04-15 | Stop reason: WASHOUT

## 2023-10-10 RX ORDER — ROSUVASTATIN CALCIUM 10 MG/1
10 TABLET, COATED ORAL DAILY
Qty: 90 TABLET | Refills: 0 | Status: SHIPPED | OUTPATIENT
Start: 2023-10-10 | End: 2024-03-04 | Stop reason: SDUPTHER

## 2023-10-10 RX ORDER — ATENOLOL AND CHLORTHALIDONE TABLET 50; 25 MG/1; MG/1
1 TABLET ORAL DAILY
Qty: 90 TABLET | Refills: 0 | Status: SHIPPED | OUTPATIENT
Start: 2023-10-10 | End: 2024-03-04 | Stop reason: SDUPTHER

## 2023-10-10 RX ORDER — GINKGO BILOBA LEAF EXTRACT 60 MG
CAPSULE ORAL
COMMUNITY
End: 2024-04-15 | Stop reason: WASHOUT

## 2023-10-10 ASSESSMENT — ENCOUNTER SYMPTOMS
CHEST TIGHTNESS: 0
LOSS OF SENSATION IN FEET: 0
LIGHT-HEADEDNESS: 0
ADENOPATHY: 0
FATIGUE: 0
BRUISES/BLEEDS EASILY: 0
FEVER: 0
NAUSEA: 0
ABDOMINAL DISTENTION: 0
WHEEZING: 0
DIFFICULTY URINATING: 0
DEPRESSION: 1
SLEEP DISTURBANCE: 0
DYSPHORIC MOOD: 0
ABDOMINAL PAIN: 0
MYALGIAS: 0
DIARRHEA: 0
ARTHRALGIAS: 0
NERVOUS/ANXIOUS: 0
SINUS PRESSURE: 0
HEADACHES: 0
CHILLS: 0
DYSURIA: 0
SHORTNESS OF BREATH: 0
APPETITE CHANGE: 0
OCCASIONAL FEELINGS OF UNSTEADINESS: 0

## 2023-10-10 ASSESSMENT — ACTIVITIES OF DAILY LIVING (ADL)
MANAGING_FINANCES: INDEPENDENT
BATHING: INDEPENDENT
TAKING_MEDICATION: INDEPENDENT
GROCERY_SHOPPING: INDEPENDENT
DRESSING: INDEPENDENT
DOING_HOUSEWORK: INDEPENDENT

## 2023-10-10 ASSESSMENT — PATIENT HEALTH QUESTIONNAIRE - PHQ9
1. LITTLE INTEREST OR PLEASURE IN DOING THINGS: SEVERAL DAYS
SUM OF ALL RESPONSES TO PHQ9 QUESTIONS 1 AND 2: 2
2. FEELING DOWN, DEPRESSED OR HOPELESS: SEVERAL DAYS
10. IF YOU CHECKED OFF ANY PROBLEMS, HOW DIFFICULT HAVE THESE PROBLEMS MADE IT FOR YOU TO DO YOUR WORK, TAKE CARE OF THINGS AT HOME, OR GET ALONG WITH OTHER PEOPLE: SOMEWHAT DIFFICULT

## 2023-10-10 NOTE — PATIENT INSTRUCTIONS

## 2023-10-10 NOTE — PROGRESS NOTES
Subjective   Patient ID: Valeria Watkins is a 68 y.o. female who presents for Medicare Annual Wellness Visit Subsequent.  PMHX, PSHx, Fam hx, and Social hx reviewed.   New concerns - labs showed lower WBC 2.8 ( Dr Roldan labs), Elevated LFTs.  Vaccines current  Dentist seen at least yearly yes  Vision concerns none  Hearing concerns none  Diet is usually overall healthy.   Smoker - no  Alcohol use - cut out drinking completely about 3 weeks  Exercising 4 days per week.   Colonoscopy current, need to get report  Mammogram 2022 , she declines mammogram this year  Last PAP 2019  DEXA 2022, current     Medicare Wellness Billing Compliance Satisfied    *This is a visual tool to show completion of required items on the day of the visit. Green checks will only appear on the date of visit.    Review all medications by prescribing practitioner or clinical pharmacist (such as prescriptions, OTCs, herbal therapies and supplements) documented in the medical record    Past Medical, Surgical, and Family History reviewed and updated in chart    Tobacco Use Reviewed    Alcohol Use Reviewed    Illicit Drug Use Reviewed    PHQ2/9    Falls in Last Year Reviewed    Home Safety Risk Factors Reviewed    Cognitive Impairment Reviewed    Patient Self Assessment and Health Status    Current Diet Reviewed    Exercise Frequency    ADL - Hearing Impairment    ADL - Bathing    ADL - Dressing    ADL - Walks in Home    IADL - Managing Finances    IADL - Grocery Shopping    IADL - Taking Medications    IADL - Doing Housework        Review of Systems   Constitutional:  Negative for appetite change, chills, fatigue and fever.   HENT:  Negative for congestion, ear pain and sinus pressure.    Eyes:  Negative for visual disturbance.   Respiratory:  Negative for chest tightness, shortness of breath and wheezing.    Cardiovascular:  Negative for chest pain.   Gastrointestinal:  Negative for abdominal distention, abdominal pain,  "diarrhea and nausea.   Genitourinary:  Negative for difficulty urinating, dysuria and pelvic pain.   Musculoskeletal:  Negative for arthralgias and myalgias.   Skin:  Negative for rash.   Allergic/Immunologic: Negative for immunocompromised state.   Neurological:  Negative for light-headedness and headaches.   Hematological:  Negative for adenopathy. Does not bruise/bleed easily.   Psychiatric/Behavioral:  Negative for dysphoric mood and sleep disturbance. The patient is not nervous/anxious.        Objective   /74   Pulse 68   Resp 12   Ht 1.676 m (5' 6\")   Wt 55.3 kg (122 lb)   BMI 19.69 kg/m²    Physical Exam  Constitutional:       General: She is not in acute distress.     Appearance: Normal appearance. She is not ill-appearing.   HENT:      Head: Normocephalic and atraumatic.      Right Ear: Tympanic membrane, ear canal and external ear normal.      Left Ear: Tympanic membrane, ear canal and external ear normal.      Nose: Nose normal.      Mouth/Throat:      Mouth: Mucous membranes are moist.      Pharynx: No oropharyngeal exudate or posterior oropharyngeal erythema.   Eyes:      Extraocular Movements: Extraocular movements intact.      Conjunctiva/sclera: Conjunctivae normal.      Pupils: Pupils are equal, round, and reactive to light.   Neck:      Vascular: No carotid bruit.   Cardiovascular:      Rate and Rhythm: Normal rate and regular rhythm.      Heart sounds: Normal heart sounds. No murmur heard.  Pulmonary:      Breath sounds: Normal breath sounds. No wheezing, rhonchi or rales.   Abdominal:      General: Bowel sounds are normal. There is no distension.      Palpations: Abdomen is soft. There is no mass.      Tenderness: There is no abdominal tenderness.   Musculoskeletal:         General: No swelling or deformity.      Cervical back: Neck supple. No tenderness.   Lymphadenopathy:      Cervical: No cervical adenopathy.   Skin:     General: Skin is warm and dry.      Findings: No lesion or " rash.   Neurological:      Mental Status: She is alert and oriented to person, place, and time.      Sensory: No sensory deficit.      Motor: No weakness.      Coordination: Coordination normal.      Deep Tendon Reflexes: Reflexes normal.   Psychiatric:         Mood and Affect: Mood normal.         Behavior: Behavior normal.         Judgment: Judgment normal.           Assessment/Plan   Diagnoses and all orders for this visit:  Routine general medical examination - vaccines current. Labs reviewed and discussed. Colonoscopy current, need to get report. Mammogram declined this year. DEXA is current.  Elevated LFTs - referring to Hepatology ( son sees Dr Brush)  Follow up in 3months, 30min

## 2023-10-10 NOTE — PROGRESS NOTES
"Subjective   Reason for Visit: Valeria Watkins is an 68 y.o. female here for a Medicare Wellness visit.     Past Medical, Surgical, and Family History reviewed and updated in chart.    Reviewed all medications by prescribing practitioner or clinical pharmacist (such as prescriptions, OTCs, herbal therapies and supplements) and documented in the medical record.    HPI    Patient Care Team:  Rik Short MD as PCP - General  Rik Short MD as PCP - [employer] PCP  Yanci Maldonado MD as Consulting Physician (Hematology and Oncology)     Review of Systems    Objective   Vitals:  /74   Resp 12   Ht 1.676 m (5' 6\")   Wt 55.3 kg (122 lb)   BMI 19.69 kg/m²       Physical Exam    Assessment/Plan   Problem List Items Addressed This Visit    None         " English

## 2023-10-13 ENCOUNTER — TELEPHONE (OUTPATIENT)
Dept: PRIMARY CARE | Facility: CLINIC | Age: 68
End: 2023-10-13
Payer: COMMERCIAL

## 2023-10-13 NOTE — TELEPHONE ENCOUNTER
LVM for pt to contact office w/previous colonoscopy details (location and date). Unable to locate in chart.

## 2024-01-05 ENCOUNTER — LAB (OUTPATIENT)
Dept: LAB | Facility: LAB | Age: 69
End: 2024-01-05
Payer: COMMERCIAL

## 2024-01-19 ENCOUNTER — APPOINTMENT (OUTPATIENT)
Dept: PRIMARY CARE | Facility: CLINIC | Age: 69
End: 2024-01-19
Payer: COMMERCIAL

## 2024-01-22 ENCOUNTER — APPOINTMENT (OUTPATIENT)
Dept: PRIMARY CARE | Facility: CLINIC | Age: 69
End: 2024-01-22
Payer: COMMERCIAL

## 2024-01-26 ENCOUNTER — TELEPHONE (OUTPATIENT)
Dept: PRIMARY CARE | Facility: CLINIC | Age: 69
End: 2024-01-26
Payer: COMMERCIAL

## 2024-01-26 NOTE — TELEPHONE ENCOUNTER
Pt LVM for referral for colonoscopy. Attempted to return call and LVM w/pt to have them reschedule previously scheduled follow ups and ask if this colonoscopy is screening or if she is having s/s.

## 2024-02-19 ENCOUNTER — TELEPHONE (OUTPATIENT)
Dept: PRIMARY CARE | Facility: CLINIC | Age: 69
End: 2024-02-19
Payer: COMMERCIAL

## 2024-02-19 NOTE — TELEPHONE ENCOUNTER
Pt asking for referral for colonoscopy so she may schedule prior to appt in June. Unable to tell me when previously completed or where it was done, but believes she is due. Instructed pt to contact insurance and they would be able to tell her when and where completed. Pt declined and stated she just needed referral.

## 2024-03-04 DIAGNOSIS — E78.00 ELEVATED LDL CHOLESTEROL LEVEL: ICD-10-CM

## 2024-03-04 DIAGNOSIS — I10 PRIMARY HYPERTENSION: ICD-10-CM

## 2024-03-04 RX ORDER — ATENOLOL AND CHLORTHALIDONE TABLET 50; 25 MG/1; MG/1
1 TABLET ORAL DAILY
Qty: 90 TABLET | Refills: 0 | Status: SHIPPED | OUTPATIENT
Start: 2024-03-04 | End: 2024-06-03 | Stop reason: SDUPTHER

## 2024-03-04 RX ORDER — AMLODIPINE BESYLATE 5 MG/1
5 TABLET ORAL DAILY
Qty: 90 TABLET | Refills: 0 | Status: SHIPPED | OUTPATIENT
Start: 2024-03-04 | End: 2024-06-03 | Stop reason: SDUPTHER

## 2024-03-04 RX ORDER — ROSUVASTATIN CALCIUM 10 MG/1
10 TABLET, COATED ORAL DAILY
Qty: 90 TABLET | Refills: 0 | Status: SHIPPED | OUTPATIENT
Start: 2024-03-04 | End: 2024-06-03 | Stop reason: SDUPTHER

## 2024-04-09 ENCOUNTER — APPOINTMENT (OUTPATIENT)
Dept: HEMATOLOGY/ONCOLOGY | Facility: CLINIC | Age: 69
End: 2024-04-09
Payer: COMMERCIAL

## 2024-04-15 ENCOUNTER — OFFICE VISIT (OUTPATIENT)
Dept: GASTROENTEROLOGY | Facility: CLINIC | Age: 69
End: 2024-04-15
Payer: COMMERCIAL

## 2024-04-15 VITALS
SYSTOLIC BLOOD PRESSURE: 153 MMHG | BODY MASS INDEX: 18.8 KG/M2 | WEIGHT: 117 LBS | DIASTOLIC BLOOD PRESSURE: 67 MMHG | HEIGHT: 66 IN

## 2024-04-15 DIAGNOSIS — Z12.11 SCREENING FOR COLON CANCER: Primary | ICD-10-CM

## 2024-04-15 PROCEDURE — 1036F TOBACCO NON-USER: CPT | Performed by: INTERNAL MEDICINE

## 2024-04-15 PROCEDURE — 99203 OFFICE O/P NEW LOW 30 MIN: CPT | Performed by: INTERNAL MEDICINE

## 2024-04-15 PROCEDURE — 1159F MED LIST DOCD IN RCRD: CPT | Performed by: INTERNAL MEDICINE

## 2024-04-15 PROCEDURE — 3078F DIAST BP <80 MM HG: CPT | Performed by: INTERNAL MEDICINE

## 2024-04-15 PROCEDURE — 3077F SYST BP >= 140 MM HG: CPT | Performed by: INTERNAL MEDICINE

## 2024-04-15 ASSESSMENT — ENCOUNTER SYMPTOMS
BRUISES/BLEEDS EASILY: 0
WEAKNESS: 0
UNEXPECTED WEIGHT CHANGE: 0
ROS GI COMMENTS: AS DETAILED ABOVE.
DYSURIA: 0
CONFUSION: 0
HEMATURIA: 0
WHEEZING: 0
TROUBLE SWALLOWING: 0
VOICE CHANGE: 0
ADENOPATHY: 0
PALPITATIONS: 0
SEIZURES: 0
SHORTNESS OF BREATH: 0
NUMBNESS: 0
SORE THROAT: 0
HEADACHES: 0
MYALGIAS: 0
NERVOUS/ANXIOUS: 0
COUGH: 0
FEVER: 0
ARTHRALGIAS: 0
CHILLS: 0
DIZZINESS: 0
FATIGUE: 0

## 2024-04-15 NOTE — PATIENT INSTRUCTIONS
You have been scheduled for a colonoscopy.  You were given instructions for preparing for this test in the office today.  If you have questions about these instructions, please call my office at [phone].    After your procedure, you can expect me to talk to you to go over the results of the procedure. If polyps were removed I will usually be able to tell you my initial thoughts about those polyps and give you some idea of when you should have another colonoscopy.    If any polyps are removed during your procedure or if any biopsies are obtained those specimens will go to the pathologists to review under the microscope. Once those results are available they will be sent to me electronically to review. These results will also be available to you at that time through the patient portal. I briefly review all of these results by the next business day to determine if there is any urgent information that needs to be communicated to you right away or anything that would significantly change what I told you at the time of the procedure. If there is nothing urgent this is usually a good sign and I will then do a more extensive review of the result and send you a letter with my final recommendations within a week of the result becoming available. If you have questions or need additional information I urge you to call the office at [phone], but I do ask for patience as I spend a good portion of each day performing procedures like the one you are scheduled for and during those times I am not able to take or return routine phone calls.    You were also given information regarding the schedule for your procedure including the time that you need to arrive to the endoscopy unit.  You will also be contacted 2-3 day prior to your procedure to confirm the final arrival time.  If you have questions about this or if you need to cancel or change this appointment please call my office at [phone].       Follow up with GI  as needed.

## 2024-04-15 NOTE — H&P (VIEW-ONLY)
[facility]    ASSESSMENT and PLAN:       Valeria Watkins is a 68 y.o. female with a significant past medical history of HTN, HLD, anemia, ADD, OA and anxiety/depression who presents for consultation requested by her primary care provider (Rik Short MD) to discuss a colonoscopy.       Colon cancer screening  Due for screening colonoscopy.  Asymptomatic at this time.  No previous difficulties with sedation.  No antiplatelet/antithrombotic use except for 81 mg ASA.  - colonoscopy scheduled in endo  - OTC bowel prep discussed with patient  - instructions for procedure discussed with patient in the office today and hard copy given to patient today      Follow up with GI as needed.        Chano Celestin MD        Gastroenterology    [facility]    Clinical   [facility]        Subjective   HISTORY OF PRESENT ILLNESS:     Chief Complaint  New Patient Visit (Discuss colonoscopy)    History Of Present Illness:    Valeria Watkins is a 68 y.o. female with a significant past medical history of HTN, HLD, anemia, ADD, OA and anxiety/depression who presents for consultation requested by her primary care provider (Rik Short MD) to discuss a colonoscopy.       She reported that her last colonoscopy was in 2013 and was done in [place].    Labs have shown chronic leukopenia which is followed by hematology. Hgb has been normal and platelets have been normal. CMP has shown mild ALT/AST elevation and her PCP has referred her to hepatology.      She says that her last colonoscopy was in 2013 and was normal. She denies any GI symptoms. She denies any previous issues with sedation.      Patient denies any heartburn/GERD, N/V, dysphagia, odynophagia, abdominal pain, diarrhea, constipation, hematemesis, hematochezia, melena, or weight loss.      Review of systems:   Review of Systems   Constitutional:   Negative for chills, fatigue, fever and unexpected weight change.   HENT:  Negative for congestion, sneezing, sore throat, trouble swallowing and voice change.    Respiratory:  Negative for cough, shortness of breath and wheezing.    Cardiovascular:  Negative for chest pain, palpitations and leg swelling.   Gastrointestinal:         As detailed above.   Genitourinary:  Negative for dysuria and hematuria.   Musculoskeletal:  Negative for arthralgias and myalgias.   Skin:  Negative for pallor and rash.   Neurological:  Negative for dizziness, seizures, syncope, weakness, numbness and headaches.   Hematological:  Negative for adenopathy. Does not bruise/bleed easily.   Psychiatric/Behavioral:  Negative for confusion. The patient is not nervous/anxious.    All other systems reviewed and are negative.      I performed a complete 10 point review of systems and it is negative except as noted in HPI or above.      PAST HISTORIES:       Past Medical History:  She has a past medical history of Abnormal weight loss (12/16/2019), ADHD (attention deficit hyperactivity disorder) (8/1995), Anxiety (1966), Asymptomatic menopausal state (07/23/2020), Cellulitis of left toe (11/25/2019), Cellulitis of left upper limb (10/14/2021), Corns and callosities (11/25/2019), Depression, unspecified (09/08/2020), Disorder of the skin and subcutaneous tissue, unspecified (12/18/2019), Hypertension (2013), Liver disease, unspecified (11/13/2022), Muscle spasm of back (07/24/2019), Osteoporosis (2017), Other allergy status, other than to drugs and biological substances (01/08/2018), Pain in left arm (08/11/2021), Pain in left hip, Pain in right hip (04/12/2017), Pain in right hip (04/13/2018), Pain in right hip (05/02/2019), Pain in unspecified hip (09/23/2020), Pain in unspecified joint (01/16/2020), Pain in unspecified knee (06/22/2018), Personal history of diseases of the blood and blood-forming organs and certain disorders involving the  immune mechanism (05/02/2022), Personal history of diseases of the skin and subcutaneous tissue (09/21/2014), Personal history of diseases of the skin and subcutaneous tissue (07/15/2016), Personal history of other diseases of the circulatory system (03/01/2021), Personal history of other diseases of the female genital tract (07/23/2020), Personal history of other diseases of the female genital tract (01/15/2016), Personal history of other diseases of the female genital tract (09/17/2015), Personal history of other drug therapy (09/08/2020), Personal history of other drug therapy (09/08/2020), Personal history of other infectious and parasitic diseases (09/01/2021), Personal history of other specified conditions (02/23/2021), Rash and other nonspecific skin eruption (11/25/2019), Unilateral primary osteoarthritis, left hip (10/13/2020), Unilateral primary osteoarthritis, right hip (07/26/2017), Unspecified acquired deformity of left lower leg (01/11/2019), Unspecified acquired deformity of right lower leg (01/11/2019), and Unspecified symptoms and signs involving the genitourinary system (10/19/2020).    Past Surgical History:  She has a past surgical history that includes Other surgical history (07/23/2020); Other surgical history (Bilateral, 07/23/2020); Other surgical history (07/23/2020); and Breast surgery (1979).      Social History:  She reports that she has never smoked. She has never used smokeless tobacco. She reports that she does not currently use alcohol. She reports that she does not use drugs.    Family History:  No known GI disease, specifically denies pancreatitis, Crohn's, colon cancer, gastroesophageal cancer, or ulcerative colitis.    Family History   Problem Relation Name Age of Onset    Heart murmur Mother Danielle Adina Watkins     Hypertension Mother Danielle Adina Watkins     Hypertension Father Raciel Sidhupapa Watkins     Throat cancer Daughter      Ulcerative colitis Son         "  Allergies:  Grass pollen, House dust, and Latex      Objective   OBJECTIVE:       Last Recorded Vitals:  Vitals:    04/15/24 0912   BP: 153/67   Weight: 53.1 kg (117 lb)   Height: 1.676 m (5' 6\")     /67   Ht 1.676 m (5' 6\")   Wt 53.1 kg (117 lb)   BMI 18.88 kg/m²      Physical Exam:    Physical Exam  Vitals reviewed.   Constitutional:       General: She is not in acute distress.     Appearance: She is not ill-appearing.   HENT:      Head: Normocephalic and atraumatic.   Eyes:      General: No scleral icterus.  Cardiovascular:      Rate and Rhythm: Normal rate and regular rhythm.      Pulses: Normal pulses.      Heart sounds: Normal heart sounds. No murmur heard.  Pulmonary:      Effort: Pulmonary effort is normal. No respiratory distress.      Breath sounds: Normal breath sounds. No wheezing.   Abdominal:      General: Bowel sounds are normal.      Palpations: Abdomen is soft.      Tenderness: There is no abdominal tenderness. There is no rebound.   Musculoskeletal:         General: No swelling or deformity.   Skin:     General: Skin is warm and dry.      Coloration: Skin is not jaundiced.      Findings: No rash.   Neurological:      General: No focal deficit present.      Mental Status: She is alert and oriented to person, place, and time.   Psychiatric:         Mood and Affect: Mood normal.         Behavior: Behavior normal.         Thought Content: Thought content normal.         Judgment: Judgment normal.         Home Medications:  Prior to Admission medications    Medication Sig Start Date End Date Taking? Authorizing Provider   acetaminophen (Tylenol 8 Hour) 650 mg ER tablet Take 1 tablet (650 mg) by mouth every 8 hours if needed for mild pain (1 - 3). Do not crush, chew, or split.    Historical Provider, MD   amitriptyline (Elavil) 25 mg tablet Take by mouth. 2/10/16   Historical Provider, MD   amLODIPine (Norvasc) 5 mg tablet Take 1 tablet (5 mg) by mouth once daily. 3/4/24   Rik Short, " MD   amoxicillin (Amoxil) 500 mg tablet Take 4 tablets (2,000 mg) by mouth if needed.    Historical Provider, MD   aspirin 81 mg EC tablet Take 1 tablet (81 mg) by mouth once daily. 4/27/23 4/26/24  Rik Short MD   atenoloL-chlorthalidone (Tenoretic) 50-25 mg tablet Take 1 tablet by mouth once daily. 3/4/24   Rik Short MD   diazePAM (Valium) 5 mg tablet Take 1 tablet (5 mg) by mouth as needed at bedtime.    Historical Provider, MD   ferrous sulfate 325 (65 Fe) MG tablet Take 1 tablet (65 mg of iron) by mouth every other day.    Historical Provider, MD   GINSENG ORAL Take by mouth.    Historical Provider, MD   green tea leaf extract (Green Tea) capsule Take by mouth.    Historical Provider, MD   loratadine (Claritin) 10 mg tablet Take 1 tablet (10 mg) by mouth once daily. 4/25/23   Rik Short MD   methylphenidate (Ritalin) 20 mg tablet Take 1 tablet (20 mg) by mouth in the morning and 1 tablet (20 mg) in the evening and 1 tablet (20 mg) before bedtime. 9/1/20   Historical Provider, MD   multivit-min-iron-FA-lutein (Centrum Silver Women) 8 mg iron-400 mcg-300 mcg tablet Take 1 tablet by mouth once daily.    Historical Provider, MD   RED BEET ROOT-SOUR CHERRY EXT ORAL Take by mouth.    Historical Provider, MD   rosuvastatin (Crestor) 10 mg tablet Take 1 tablet (10 mg) by mouth once daily. 3/4/24 8/31/24  Rik Short MD         Relevant Results Recent labs reviewed in the EMR.    Lab Results   Component Value Date/Time    WBC 2.8 (L) 10/09/2023 1141    HGB 12.6 10/09/2023 1141    HGB 12.6 10/06/2023 0925    HGB 12.3 04/11/2023 0640    HGB 13.0 11/04/2022 0634    HGB 12.0 05/02/2022 0741     10/09/2023 1141     10/09/2023 1141     10/06/2023 0925     04/11/2023 0640     11/04/2022 0634     05/02/2022 0741    IRON 40 10/06/2023 0925    TIBC 399 10/06/2023 0925    IRONSAT 10 (L) 10/06/2023 0925    FERRITIN 130 10/06/2023 0925    [id number]  "1,323 (H) 10/06/2023 0925    FOLATE >24.0 10/06/2023 0925       Lab Results   Component Value Date/Time     10/06/2023 0925    K 3.8 10/06/2023 0925     10/06/2023 0925    BUN 8 10/06/2023 0925    CREATININE 0.73 10/06/2023 0925    CREATININE 0.84 04/11/2023 0640    CREATININE 0.95 11/04/2022 0634       Lab Results   Component Value Date/Time    BILITOT 0.5 10/06/2023 0925    BILITOT 0.4 04/11/2023 0640    BILITOT 0.6 11/04/2022 0634    BILITOT 0.6 05/02/2022 0741    ALKPHOS 89 10/06/2023 0925    ALKPHOS 69 04/11/2023 0640    ALKPHOS 77 11/04/2022 0634    ALKPHOS 74 05/02/2022 0741    AST 74 (H) 10/06/2023 0925    AST 57 (H) 04/11/2023 0640    AST 46 (H) 11/04/2022 0634    AST 46 (H) 05/02/2022 0741    ALT 71 (H) 10/06/2023 0925    ALT 52 (H) 04/11/2023 0640    ALT 36 11/04/2022 0634    ALT 39 05/02/2022 0741       No results found for: \"CRP\", \"CALPS\"    Radiology: Imaging reviewed in the EMR.      "

## 2024-04-15 NOTE — PROGRESS NOTES
[facility]    ASSESSMENT and PLAN:       Valeria Watkins is a 68 y.o. female with a significant past medical history of HTN, HLD, anemia, ADD, OA and anxiety/depression who presents for consultation requested by her primary care provider (Rik Short MD) to discuss a colonoscopy.       Colon cancer screening  Due for screening colonoscopy.  Asymptomatic at this time.  No previous difficulties with sedation.  No antiplatelet/antithrombotic use except for 81 mg ASA.  - colonoscopy scheduled in endo  - OTC bowel prep discussed with patient  - instructions for procedure discussed with patient in the office today and hard copy given to patient today      Follow up with GI as needed.        Chano Celestin MD        Gastroenterology    [facility]    Clinical   [facility]        Subjective   HISTORY OF PRESENT ILLNESS:     Chief Complaint  New Patient Visit (Discuss colonoscopy)    History Of Present Illness:    Valeria Watikns is a 68 y.o. female with a significant past medical history of HTN, HLD, anemia, ADD, OA and anxiety/depression who presents for consultation requested by her primary care provider (Rik Short MD) to discuss a colonoscopy.       She reported that her last colonoscopy was in 2013 and was done in [place].    Labs have shown chronic leukopenia which is followed by hematology. Hgb has been normal and platelets have been normal. CMP has shown mild ALT/AST elevation and her PCP has referred her to hepatology.      She says that her last colonoscopy was in 2013 and was normal. She denies any GI symptoms. She denies any previous issues with sedation.      Patient denies any heartburn/GERD, N/V, dysphagia, odynophagia, abdominal pain, diarrhea, constipation, hematemesis, hematochezia, melena, or weight loss.      Review of systems:   Review of Systems   Constitutional:   Negative for chills, fatigue, fever and unexpected weight change.   HENT:  Negative for congestion, sneezing, sore throat, trouble swallowing and voice change.    Respiratory:  Negative for cough, shortness of breath and wheezing.    Cardiovascular:  Negative for chest pain, palpitations and leg swelling.   Gastrointestinal:         As detailed above.   Genitourinary:  Negative for dysuria and hematuria.   Musculoskeletal:  Negative for arthralgias and myalgias.   Skin:  Negative for pallor and rash.   Neurological:  Negative for dizziness, seizures, syncope, weakness, numbness and headaches.   Hematological:  Negative for adenopathy. Does not bruise/bleed easily.   Psychiatric/Behavioral:  Negative for confusion. The patient is not nervous/anxious.    All other systems reviewed and are negative.      I performed a complete 10 point review of systems and it is negative except as noted in HPI or above.      PAST HISTORIES:       Past Medical History:  She has a past medical history of Abnormal weight loss (12/16/2019), ADHD (attention deficit hyperactivity disorder) (8/1995), Anxiety (1966), Asymptomatic menopausal state (07/23/2020), Cellulitis of left toe (11/25/2019), Cellulitis of left upper limb (10/14/2021), Corns and callosities (11/25/2019), Depression, unspecified (09/08/2020), Disorder of the skin and subcutaneous tissue, unspecified (12/18/2019), Hypertension (2013), Liver disease, unspecified (11/13/2022), Muscle spasm of back (07/24/2019), Osteoporosis (2017), Other allergy status, other than to drugs and biological substances (01/08/2018), Pain in left arm (08/11/2021), Pain in left hip, Pain in right hip (04/12/2017), Pain in right hip (04/13/2018), Pain in right hip (05/02/2019), Pain in unspecified hip (09/23/2020), Pain in unspecified joint (01/16/2020), Pain in unspecified knee (06/22/2018), Personal history of diseases of the blood and blood-forming organs and certain disorders involving the  immune mechanism (05/02/2022), Personal history of diseases of the skin and subcutaneous tissue (09/21/2014), Personal history of diseases of the skin and subcutaneous tissue (07/15/2016), Personal history of other diseases of the circulatory system (03/01/2021), Personal history of other diseases of the female genital tract (07/23/2020), Personal history of other diseases of the female genital tract (01/15/2016), Personal history of other diseases of the female genital tract (09/17/2015), Personal history of other drug therapy (09/08/2020), Personal history of other drug therapy (09/08/2020), Personal history of other infectious and parasitic diseases (09/01/2021), Personal history of other specified conditions (02/23/2021), Rash and other nonspecific skin eruption (11/25/2019), Unilateral primary osteoarthritis, left hip (10/13/2020), Unilateral primary osteoarthritis, right hip (07/26/2017), Unspecified acquired deformity of left lower leg (01/11/2019), Unspecified acquired deformity of right lower leg (01/11/2019), and Unspecified symptoms and signs involving the genitourinary system (10/19/2020).    Past Surgical History:  She has a past surgical history that includes Other surgical history (07/23/2020); Other surgical history (Bilateral, 07/23/2020); Other surgical history (07/23/2020); and Breast surgery (1979).      Social History:  She reports that she has never smoked. She has never used smokeless tobacco. She reports that she does not currently use alcohol. She reports that she does not use drugs.    Family History:  No known GI disease, specifically denies pancreatitis, Crohn's, colon cancer, gastroesophageal cancer, or ulcerative colitis.    Family History   Problem Relation Name Age of Onset    Heart murmur Mother Danielle Adina Watkins     Hypertension Mother Danielle Adina Watkins     Hypertension Father Raciel Sidhupapa Watkins     Throat cancer Daughter      Ulcerative colitis Son         "  Allergies:  Grass pollen, House dust, and Latex      Objective   OBJECTIVE:       Last Recorded Vitals:  Vitals:    04/15/24 0912   BP: 153/67   Weight: 53.1 kg (117 lb)   Height: 1.676 m (5' 6\")     /67   Ht 1.676 m (5' 6\")   Wt 53.1 kg (117 lb)   BMI 18.88 kg/m²      Physical Exam:    Physical Exam  Vitals reviewed.   Constitutional:       General: She is not in acute distress.     Appearance: She is not ill-appearing.   HENT:      Head: Normocephalic and atraumatic.   Eyes:      General: No scleral icterus.  Cardiovascular:      Rate and Rhythm: Normal rate and regular rhythm.      Pulses: Normal pulses.      Heart sounds: Normal heart sounds. No murmur heard.  Pulmonary:      Effort: Pulmonary effort is normal. No respiratory distress.      Breath sounds: Normal breath sounds. No wheezing.   Abdominal:      General: Bowel sounds are normal.      Palpations: Abdomen is soft.      Tenderness: There is no abdominal tenderness. There is no rebound.   Musculoskeletal:         General: No swelling or deformity.   Skin:     General: Skin is warm and dry.      Coloration: Skin is not jaundiced.      Findings: No rash.   Neurological:      General: No focal deficit present.      Mental Status: She is alert and oriented to person, place, and time.   Psychiatric:         Mood and Affect: Mood normal.         Behavior: Behavior normal.         Thought Content: Thought content normal.         Judgment: Judgment normal.         Home Medications:  Prior to Admission medications    Medication Sig Start Date End Date Taking? Authorizing Provider   acetaminophen (Tylenol 8 Hour) 650 mg ER tablet Take 1 tablet (650 mg) by mouth every 8 hours if needed for mild pain (1 - 3). Do not crush, chew, or split.    Historical Provider, MD   amitriptyline (Elavil) 25 mg tablet Take by mouth. 2/10/16   Historical Provider, MD   amLODIPine (Norvasc) 5 mg tablet Take 1 tablet (5 mg) by mouth once daily. 3/4/24   Rik Short, " MD   amoxicillin (Amoxil) 500 mg tablet Take 4 tablets (2,000 mg) by mouth if needed.    Historical Provider, MD   aspirin 81 mg EC tablet Take 1 tablet (81 mg) by mouth once daily. 4/27/23 4/26/24  Rik Short MD   atenoloL-chlorthalidone (Tenoretic) 50-25 mg tablet Take 1 tablet by mouth once daily. 3/4/24   Rik Short MD   diazePAM (Valium) 5 mg tablet Take 1 tablet (5 mg) by mouth as needed at bedtime.    Historical Provider, MD   ferrous sulfate 325 (65 Fe) MG tablet Take 1 tablet (65 mg of iron) by mouth every other day.    Historical Provider, MD   GINSENG ORAL Take by mouth.    Historical Provider, MD   green tea leaf extract (Green Tea) capsule Take by mouth.    Historical Provider, MD   loratadine (Claritin) 10 mg tablet Take 1 tablet (10 mg) by mouth once daily. 4/25/23   Rik Short MD   methylphenidate (Ritalin) 20 mg tablet Take 1 tablet (20 mg) by mouth in the morning and 1 tablet (20 mg) in the evening and 1 tablet (20 mg) before bedtime. 9/1/20   Historical Provider, MD   multivit-min-iron-FA-lutein (Centrum Silver Women) 8 mg iron-400 mcg-300 mcg tablet Take 1 tablet by mouth once daily.    Historical Provider, MD   RED BEET ROOT-SOUR CHERRY EXT ORAL Take by mouth.    Historical Provider, MD   rosuvastatin (Crestor) 10 mg tablet Take 1 tablet (10 mg) by mouth once daily. 3/4/24 8/31/24  Rik Short MD         Relevant Results Recent labs reviewed in the EMR.    Lab Results   Component Value Date/Time    WBC 2.8 (L) 10/09/2023 1141    HGB 12.6 10/09/2023 1141    HGB 12.6 10/06/2023 0925    HGB 12.3 04/11/2023 0640    HGB 13.0 11/04/2022 0634    HGB 12.0 05/02/2022 0741     10/09/2023 1141     10/09/2023 1141     10/06/2023 0925     04/11/2023 0640     11/04/2022 0634     05/02/2022 0741    IRON 40 10/06/2023 0925    TIBC 399 10/06/2023 0925    IRONSAT 10 (L) 10/06/2023 0925    FERRITIN 130 10/06/2023 0925    [id number]  "1,323 (H) 10/06/2023 0925    FOLATE >24.0 10/06/2023 0925       Lab Results   Component Value Date/Time     10/06/2023 0925    K 3.8 10/06/2023 0925     10/06/2023 0925    BUN 8 10/06/2023 0925    CREATININE 0.73 10/06/2023 0925    CREATININE 0.84 04/11/2023 0640    CREATININE 0.95 11/04/2022 0634       Lab Results   Component Value Date/Time    BILITOT 0.5 10/06/2023 0925    BILITOT 0.4 04/11/2023 0640    BILITOT 0.6 11/04/2022 0634    BILITOT 0.6 05/02/2022 0741    ALKPHOS 89 10/06/2023 0925    ALKPHOS 69 04/11/2023 0640    ALKPHOS 77 11/04/2022 0634    ALKPHOS 74 05/02/2022 0741    AST 74 (H) 10/06/2023 0925    AST 57 (H) 04/11/2023 0640    AST 46 (H) 11/04/2022 0634    AST 46 (H) 05/02/2022 0741    ALT 71 (H) 10/06/2023 0925    ALT 52 (H) 04/11/2023 0640    ALT 36 11/04/2022 0634    ALT 39 05/02/2022 0741       No results found for: \"CRP\", \"CALPS\"    Radiology: Imaging reviewed in the EMR.      "

## 2024-04-23 ENCOUNTER — PREP FOR PROCEDURE (OUTPATIENT)
Dept: GASTROENTEROLOGY | Facility: CLINIC | Age: 69
End: 2024-04-23
Payer: COMMERCIAL

## 2024-04-23 RX ORDER — SODIUM CHLORIDE 9 MG/ML
20 INJECTION, SOLUTION INTRAVENOUS CONTINUOUS
Status: CANCELLED | OUTPATIENT
Start: 2024-04-23

## 2024-04-24 ENCOUNTER — HOSPITAL ENCOUNTER (OUTPATIENT)
Dept: GASTROENTEROLOGY | Facility: HOSPITAL | Age: 69
Discharge: HOME | End: 2024-04-24
Payer: COMMERCIAL

## 2024-04-24 ENCOUNTER — ANESTHESIA EVENT (OUTPATIENT)
Dept: GASTROENTEROLOGY | Facility: HOSPITAL | Age: 69
End: 2024-04-24
Payer: COMMERCIAL

## 2024-04-24 ENCOUNTER — ANESTHESIA (OUTPATIENT)
Dept: GASTROENTEROLOGY | Facility: HOSPITAL | Age: 69
End: 2024-04-24
Payer: COMMERCIAL

## 2024-04-24 VITALS
WEIGHT: 120 LBS | HEIGHT: 65 IN | TEMPERATURE: 97.5 F | SYSTOLIC BLOOD PRESSURE: 134 MMHG | BODY MASS INDEX: 19.99 KG/M2 | OXYGEN SATURATION: 98 % | DIASTOLIC BLOOD PRESSURE: 71 MMHG | HEART RATE: 79 BPM | RESPIRATION RATE: 12 BRPM

## 2024-04-24 DIAGNOSIS — Z12.11 SCREENING FOR COLON CANCER: ICD-10-CM

## 2024-04-24 PROCEDURE — 3700000001 HC GENERAL ANESTHESIA TIME - INITIAL BASE CHARGE

## 2024-04-24 PROCEDURE — 2500000004 HC RX 250 GENERAL PHARMACY W/ HCPCS (ALT 636 FOR OP/ED): Performed by: NURSE ANESTHETIST, CERTIFIED REGISTERED

## 2024-04-24 PROCEDURE — 2500000004 HC RX 250 GENERAL PHARMACY W/ HCPCS (ALT 636 FOR OP/ED): Performed by: INTERNAL MEDICINE

## 2024-04-24 PROCEDURE — 7100000010 HC PHASE TWO TIME - EACH INCREMENTAL 1 MINUTE

## 2024-04-24 PROCEDURE — 2500000005 HC RX 250 GENERAL PHARMACY W/O HCPCS: Performed by: NURSE ANESTHETIST, CERTIFIED REGISTERED

## 2024-04-24 PROCEDURE — 7100000009 HC PHASE TWO TIME - INITIAL BASE CHARGE

## 2024-04-24 PROCEDURE — 45380 COLONOSCOPY AND BIOPSY: CPT | Performed by: INTERNAL MEDICINE

## 2024-04-24 PROCEDURE — 88305 TISSUE EXAM BY PATHOLOGIST: CPT | Mod: TC,PORLAB | Performed by: INTERNAL MEDICINE

## 2024-04-24 PROCEDURE — 88305 TISSUE EXAM BY PATHOLOGIST: CPT | Performed by: PATHOLOGY

## 2024-04-24 PROCEDURE — 3700000002 HC GENERAL ANESTHESIA TIME - EACH INCREMENTAL 1 MINUTE

## 2024-04-24 RX ORDER — SODIUM CHLORIDE 9 MG/ML
20 INJECTION, SOLUTION INTRAVENOUS CONTINUOUS
Status: DISCONTINUED | OUTPATIENT
Start: 2024-04-24 | End: 2024-04-25 | Stop reason: HOSPADM

## 2024-04-24 RX ORDER — PROPOFOL 10 MG/ML
INJECTION, EMULSION INTRAVENOUS AS NEEDED
Status: DISCONTINUED | OUTPATIENT
Start: 2024-04-24 | End: 2024-04-24

## 2024-04-24 RX ORDER — PHENYLEPHRINE HYDROCHLORIDE 10 MG/ML
INJECTION INTRAVENOUS AS NEEDED
Status: DISCONTINUED | OUTPATIENT
Start: 2024-04-24 | End: 2024-04-24

## 2024-04-24 RX ORDER — LIDOCAINE HCL/PF 100 MG/5ML
SYRINGE (ML) INTRAVENOUS AS NEEDED
Status: DISCONTINUED | OUTPATIENT
Start: 2024-04-24 | End: 2024-04-24

## 2024-04-24 RX ADMIN — PHENYLEPHRINE HYDROCHLORIDE 100 MCG: 10 INJECTION INTRAVENOUS at 10:10

## 2024-04-24 RX ADMIN — LIDOCAINE HYDROCHLORIDE 60 MG: 20 INJECTION, SOLUTION INTRAVENOUS at 09:52

## 2024-04-24 RX ADMIN — PROPOFOL 400 MG: 10 INJECTION, EMULSION INTRAVENOUS at 09:52

## 2024-04-24 RX ADMIN — SODIUM CHLORIDE 20 ML/HR: 9 INJECTION, SOLUTION INTRAVENOUS at 08:33

## 2024-04-24 SDOH — HEALTH STABILITY: MENTAL HEALTH: CURRENT SMOKER: 0

## 2024-04-24 ASSESSMENT — PAIN SCALES - GENERAL
PAINLEVEL_OUTOF10: 0 - NO PAIN
PAINLEVEL_OUTOF10: 0 - NO PAIN
PAINLEVEL_OUTOF10: 7
PAINLEVEL_OUTOF10: 0 - NO PAIN

## 2024-04-24 ASSESSMENT — PAIN - FUNCTIONAL ASSESSMENT
PAIN_FUNCTIONAL_ASSESSMENT: 0-10

## 2024-04-24 ASSESSMENT — COLUMBIA-SUICIDE SEVERITY RATING SCALE - C-SSRS
1. IN THE PAST MONTH, HAVE YOU WISHED YOU WERE DEAD OR WISHED YOU COULD GO TO SLEEP AND NOT WAKE UP?: NO
6. HAVE YOU EVER DONE ANYTHING, STARTED TO DO ANYTHING, OR PREPARED TO DO ANYTHING TO END YOUR LIFE?: NO
2. HAVE YOU ACTUALLY HAD ANY THOUGHTS OF KILLING YOURSELF?: NO

## 2024-04-24 NOTE — ADDENDUM NOTE
Encounter addended by: Chaya Olivia RN on: 4/24/2024 2:31 PM   Actions taken: Check Out activity completed

## 2024-04-24 NOTE — ANESTHESIA PREPROCEDURE EVALUATION
Patient: Valeria Watkins    Procedure Information       Date/Time: 04/24/24 0930    Scheduled providers: Chano Celestin MD    Procedure: COLONOSCOPY    Location: [facility]            Relevant Problems   Anesthesia (within normal limits)      Cardiac   (+) HTN (hypertension)      Neuro   (+) Anxiety disorder   (+) Major depression, recurrent (CMS-HCC)      Liver   (+) Hepatic lesion      Hematology   (+) Anemia      Musculoskeletal   (+) Erosive osteoarthritis of hand      GYN   (+) Fibroid uterus       Clinical information reviewed:   Tobacco  Allergies  Meds   Med Hx  Surg Hx   Fam Hx  Soc Hx        NPO Detail:  NPO/Void Status  Date of Last Liquid: 04/24/24  Time of Last Liquid: 0600  Date of Last Solid: 04/22/24  Time of Last Solid: 1400  Last Intake Type: Clear fluids  Time of Last Void: 0600         Physical Exam    Airway  Mallampati: II     Cardiovascular - normal exam     Dental    Pulmonary - normal exam     Abdominal      Other findings: Upper and lower partials at home          Anesthesia Plan    History of general anesthesia?: yes  History of complications of general anesthesia?: no    ASA 2     MAC     The patient is not a current smoker.    Anesthetic plan and risks discussed with patient.  Use of blood products discussed with who consented to blood products.

## 2024-04-24 NOTE — ANESTHESIA POSTPROCEDURE EVALUATION
Patient: Valeria Watkins    Procedure Summary       Date: 04/24/24 Room / Location: [facility]    Anesthesia Start: 0946 Anesthesia Stop: 1024    Procedure: COLONOSCOPY Diagnosis: Screening for colon cancer    Scheduled Providers: Chano Celestin MD Responsible Provider: CHITO Barber    Anesthesia Type: MAC ASA Status: 2            Anesthesia Type: MAC    Vitals Value Taken Time   /71 04/24/24 1037   Temp 36.4 °C (97.5 °F) 04/24/24 1017   Pulse 79 04/24/24 1037   Resp 12 04/24/24 1037   SpO2 98 % 04/24/24 1037       Anesthesia Post Evaluation    Patient location during evaluation: bedside  Patient participation: complete - patient participated  Level of consciousness: awake  Pain management: adequate  Airway patency: patent  Cardiovascular status: acceptable  Respiratory status: acceptable  Hydration status: acceptable  Postoperative Nausea and Vomiting: none    There were no known notable events for this encounter.

## 2024-04-25 NOTE — ADDENDUM NOTE
Encounter addended by: Itzel Simons RN on: 4/25/2024 10:22 AM   Actions taken: Contacts section saved, Flowsheet accepted

## 2024-04-30 LAB
LABORATORY COMMENT REPORT: NORMAL
PATH REPORT.FINAL DX SPEC: NORMAL
PATH REPORT.GROSS SPEC: NORMAL
PATH REPORT.RELEVANT HX SPEC: NORMAL
PATH REPORT.TOTAL CANCER: NORMAL

## 2024-06-01 DIAGNOSIS — R93.1 AGATSTON CAC SCORE 200-399: Primary | ICD-10-CM

## 2024-06-03 ENCOUNTER — OFFICE VISIT (OUTPATIENT)
Dept: PRIMARY CARE | Facility: CLINIC | Age: 69
End: 2024-06-03
Payer: COMMERCIAL

## 2024-06-03 VITALS
SYSTOLIC BLOOD PRESSURE: 126 MMHG | DIASTOLIC BLOOD PRESSURE: 82 MMHG | RESPIRATION RATE: 12 BRPM | BODY MASS INDEX: 18.97 KG/M2 | WEIGHT: 114 LBS | HEART RATE: 68 BPM

## 2024-06-03 DIAGNOSIS — I10 PRIMARY HYPERTENSION: ICD-10-CM

## 2024-06-03 DIAGNOSIS — F25.9 SCHIZOAFFECTIVE DISORDER, UNSPECIFIED TYPE (MULTI): ICD-10-CM

## 2024-06-03 DIAGNOSIS — F41.9 ANXIETY DISORDER, UNSPECIFIED TYPE: ICD-10-CM

## 2024-06-03 DIAGNOSIS — J30.9 ALLERGIC RHINITIS, UNSPECIFIED SEASONALITY, UNSPECIFIED TRIGGER: ICD-10-CM

## 2024-06-03 DIAGNOSIS — R93.1 AGATSTON CAC SCORE 200-399: ICD-10-CM

## 2024-06-03 DIAGNOSIS — G89.29 CHRONIC PAIN OF BOTH KNEES: ICD-10-CM

## 2024-06-03 DIAGNOSIS — M25.562 CHRONIC PAIN OF BOTH KNEES: ICD-10-CM

## 2024-06-03 DIAGNOSIS — Z12.31 VISIT FOR SCREENING MAMMOGRAM: ICD-10-CM

## 2024-06-03 DIAGNOSIS — L30.9 ECZEMA, UNSPECIFIED TYPE: ICD-10-CM

## 2024-06-03 DIAGNOSIS — D72.819 LEUKOPENIA, UNSPECIFIED TYPE: ICD-10-CM

## 2024-06-03 DIAGNOSIS — D50.9 IRON DEFICIENCY ANEMIA, UNSPECIFIED IRON DEFICIENCY ANEMIA TYPE: ICD-10-CM

## 2024-06-03 DIAGNOSIS — I51.9 DIASTOLIC DYSFUNCTION, LEFT VENTRICLE: Primary | ICD-10-CM

## 2024-06-03 DIAGNOSIS — E78.00 ELEVATED LDL CHOLESTEROL LEVEL: ICD-10-CM

## 2024-06-03 DIAGNOSIS — M25.561 CHRONIC PAIN OF BOTH KNEES: ICD-10-CM

## 2024-06-03 DIAGNOSIS — F33.41 RECURRENT MAJOR DEPRESSIVE DISORDER, IN PARTIAL REMISSION (CMS-HCC): ICD-10-CM

## 2024-06-03 PROCEDURE — 99214 OFFICE O/P EST MOD 30 MIN: CPT | Performed by: FAMILY MEDICINE

## 2024-06-03 PROCEDURE — 3079F DIAST BP 80-89 MM HG: CPT | Performed by: FAMILY MEDICINE

## 2024-06-03 PROCEDURE — 1159F MED LIST DOCD IN RCRD: CPT | Performed by: FAMILY MEDICINE

## 2024-06-03 PROCEDURE — 1036F TOBACCO NON-USER: CPT | Performed by: FAMILY MEDICINE

## 2024-06-03 PROCEDURE — 3074F SYST BP LT 130 MM HG: CPT | Performed by: FAMILY MEDICINE

## 2024-06-03 RX ORDER — ATENOLOL AND CHLORTHALIDONE TABLET 50; 25 MG/1; MG/1
1 TABLET ORAL DAILY
Qty: 90 TABLET | Refills: 1 | Status: SHIPPED | OUTPATIENT
Start: 2024-06-03

## 2024-06-03 RX ORDER — ROSUVASTATIN CALCIUM 10 MG/1
10 TABLET, COATED ORAL DAILY
Qty: 90 TABLET | Refills: 1 | Status: SHIPPED | OUTPATIENT
Start: 2024-06-03 | End: 2024-11-30

## 2024-06-03 RX ORDER — CRISABOROLE 20 MG/G
1 OINTMENT TOPICAL 2 TIMES DAILY
Qty: 60 G | Refills: 1 | Status: SHIPPED | OUTPATIENT
Start: 2024-06-03 | End: 2024-06-04 | Stop reason: SDUPTHER

## 2024-06-03 RX ORDER — LORATADINE 10 MG/1
10 TABLET ORAL DAILY
Qty: 90 TABLET | Refills: 1 | Status: SHIPPED | OUTPATIENT
Start: 2024-06-03

## 2024-06-03 RX ORDER — AMLODIPINE BESYLATE 5 MG/1
5 TABLET ORAL DAILY
Qty: 90 TABLET | Refills: 1 | Status: SHIPPED | OUTPATIENT
Start: 2024-06-03

## 2024-06-03 ASSESSMENT — ENCOUNTER SYMPTOMS
SLEEP DISTURBANCE: 0
ARTHRALGIAS: 1
LIGHT-HEADEDNESS: 0
NAUSEA: 0
ADENOPATHY: 0
HEADACHES: 0
DYSPHORIC MOOD: 0
BRUISES/BLEEDS EASILY: 0
WHEEZING: 0
CHILLS: 0
ABDOMINAL DISTENTION: 0
APPETITE CHANGE: 0
MYALGIAS: 0
DYSURIA: 0
FATIGUE: 0
SHORTNESS OF BREATH: 0
FEVER: 0
DIARRHEA: 0
NERVOUS/ANXIOUS: 0
ABDOMINAL PAIN: 0
DIFFICULTY URINATING: 0
CHEST TIGHTNESS: 0
SINUS PRESSURE: 0

## 2024-06-03 NOTE — PROGRESS NOTES
Subjective   Patient ID: Valeria Watkins is a 69 y.o. female who presents for Follow-up.    HPI     Review of Systems    Objective   /82   Pulse 68   Resp 12   Wt 51.7 kg (114 lb)   BMI 18.97 kg/m²     Physical Exam    Assessment/Plan

## 2024-06-03 NOTE — PROGRESS NOTES
Subjective   Patient ID: Valeria Watkins is a 69 y.o. female who presents for Follow-up.  Pt has chronic HTN. Diastolic dysfunction  Pt is taking Amlo, [place]/Chlorthalidone. Tolerating well.  Exercising 5-7 days per week   Low sodium diet is usually being followed.   Is not monitoring home blood pressures.   Denies HA, vision changes or CP.     Pt has Dyslipidemia, CAD.  Lipid panel showed LDL 89.  Currently taking Crestor and is tolerating well without muscle pains or weakness.     Pt has chronic and stable depression / anxiety. She has multiple family stressors.   Pt has supportive family/friends.   Pt seeing a counselor.   Taking Elavil, Diazepam, and Ritalin and it is helping.   Mood, energy, motivation, interests, sleep and appetite are adequate. Anxiety is stable.  Pt denies suicidal ideations.     For arthritic pain taking Tylenol as needed.     Pt has Dyslipidemia.   Lipid panel is due.  Currently taking Crestor and is tolerating well without muscle pains or weakness.     She has hx eczema now affecting face and scalp. She was referred to Dermatology but hasn't made appt. She was given Rx for Eucrisa and needs refill.           Review of Systems   Constitutional:  Negative for appetite change, chills, fatigue and fever.   HENT:  Negative for congestion, ear pain and sinus pressure.    Eyes:  Negative for visual disturbance.   Respiratory:  Negative for chest tightness, shortness of breath and wheezing.    Cardiovascular:  Negative for chest pain.   Gastrointestinal:  Negative for abdominal distention, abdominal pain, diarrhea and nausea.   Genitourinary:  Negative for difficulty urinating, dysuria and pelvic pain.   Musculoskeletal:  Positive for arthralgias. Negative for myalgias.   Skin:  Positive for rash.   Allergic/Immunologic: Negative for immunocompromised state.   Neurological:  Negative for light-headedness and headaches.   Hematological:  Negative for adenopathy. Does not bruise/bleed easily.    Psychiatric/Behavioral:  Negative for dysphoric mood and sleep disturbance. The patient is not nervous/anxious.        Objective   /82   Pulse 68   Resp 12   Wt 51.7 kg (114 lb)   BMI 18.97 kg/m²    Physical Exam  Constitutional:       General: She is not in acute distress.     Appearance: Normal appearance.   Cardiovascular:      Rate and Rhythm: Normal rate and regular rhythm.      Heart sounds: Normal heart sounds. No murmur heard.  Pulmonary:      Effort: Pulmonary effort is normal.      Breath sounds: Normal breath sounds.   Abdominal:      Palpations: Abdomen is soft.      Tenderness: There is no abdominal tenderness.   Neurological:      Mental Status: She is alert.   Psychiatric:         Mood and Affect: Mood normal.         Judgment: Judgment normal.           Assessment/Plan   Diagnoses and all orders for this visit:  Diastolic dysfunction, left ventricle/ High BP - well controlled, continue current doses on meds and monitor.  Allergic rhinitis - worse lately, refill sent on Loratadine  Agatston CAC score 200-399 - asymptomatic, monitoring with Dr Murray  Elevated LDL cholesterol level - doing well on Rosuvastatin, due to recheck with labs.  Iron deficiency anemia /Leukopenia - Monitoring with Dr Maldonado  Recurrent major depressive disorder/Schizoaffective disorder/Anxiety disorder- more stress lately, seeing Dr Siu. Discussed option of starting with counseling.   Eczema - sending Rx for Eucrisa, if not covered or working well would keep plan to schedule appt with Dermatology.   Chronic foot pain - worsening, to follow up with podiatry.    Follow up in 6months, 30min. Physical due in October.

## 2024-06-04 DIAGNOSIS — L30.9 ECZEMA, UNSPECIFIED TYPE: ICD-10-CM

## 2024-06-04 DIAGNOSIS — L20.9 ATOPIC DERMATITIS, MILD: Primary | ICD-10-CM

## 2024-06-04 RX ORDER — CRISABOROLE 20 MG/G
1 OINTMENT TOPICAL 2 TIMES DAILY
Qty: 60 G | Refills: 1 | Status: SHIPPED | OUTPATIENT
Start: 2024-06-04

## 2024-06-04 RX ORDER — CRISABOROLE 20 MG/G
1 OINTMENT TOPICAL 2 TIMES DAILY
Qty: 60 G | Refills: 1 | Status: SHIPPED | OUTPATIENT
Start: 2024-06-04 | End: 2024-06-04 | Stop reason: SDUPTHER

## 2024-06-05 ENCOUNTER — LAB (OUTPATIENT)
Dept: LAB | Facility: LAB | Age: 69
End: 2024-06-05
Payer: COMMERCIAL

## 2024-06-05 DIAGNOSIS — I10 PRIMARY HYPERTENSION: ICD-10-CM

## 2024-06-05 DIAGNOSIS — E78.00 ELEVATED LDL CHOLESTEROL LEVEL: ICD-10-CM

## 2024-06-05 LAB
APPEARANCE UR: CLEAR
BILIRUB UR STRIP.AUTO-MCNC: NEGATIVE MG/DL
CHOLEST SERPL-MCNC: 206 MG/DL (ref 0–199)
CHOLESTEROL/HDL RATIO: 2.2
COLOR UR: YELLOW
GLUCOSE UR STRIP.AUTO-MCNC: NORMAL MG/DL
HDLC SERPL-MCNC: 95.2 MG/DL
HOLD SPECIMEN: NORMAL
KETONES UR STRIP.AUTO-MCNC: NEGATIVE MG/DL
LDLC SERPL CALC-MCNC: 85 MG/DL
LEUKOCYTE ESTERASE UR QL STRIP.AUTO: NEGATIVE
NITRITE UR QL STRIP.AUTO: NEGATIVE
NON HDL CHOLESTEROL: 111 MG/DL (ref 0–149)
PH UR STRIP.AUTO: 7 [PH]
PROT UR STRIP.AUTO-MCNC: NEGATIVE MG/DL
RBC # UR STRIP.AUTO: NEGATIVE /UL
SP GR UR STRIP.AUTO: 1.01
TRIGL SERPL-MCNC: 127 MG/DL (ref 0–149)
UROBILINOGEN UR STRIP.AUTO-MCNC: NORMAL MG/DL
VLDL: 25 MG/DL (ref 0–40)

## 2024-06-05 PROCEDURE — 36415 COLL VENOUS BLD VENIPUNCTURE: CPT

## 2024-06-05 PROCEDURE — 81003 URINALYSIS AUTO W/O SCOPE: CPT

## 2024-06-05 PROCEDURE — 80061 LIPID PANEL: CPT

## 2024-06-06 ENCOUNTER — TELEPHONE (OUTPATIENT)
Dept: PRIMARY CARE | Facility: CLINIC | Age: 69
End: 2024-06-06
Payer: COMMERCIAL

## 2024-06-06 NOTE — TELEPHONE ENCOUNTER
----- Message from Rik Short MD sent at 6/5/2024  7:14 PM EDT -----  Labs look good   ----- Message -----  From: Lab, Background User  Sent: 6/5/2024  11:21 AM EDT  To: Rik Short MD

## 2024-07-01 DIAGNOSIS — R93.1 AGATSTON CAC SCORE 200-399: Primary | ICD-10-CM

## 2024-07-01 RX ORDER — ASPIRIN 81 MG/1
81 TABLET ORAL DAILY
COMMUNITY
End: 2024-07-01 | Stop reason: SDUPTHER

## 2024-07-01 RX ORDER — ASPIRIN 81 MG/1
81 TABLET ORAL DAILY
Qty: 100 TABLET | Refills: 1 | Status: SHIPPED | OUTPATIENT
Start: 2024-07-01

## 2024-10-09 ENCOUNTER — LAB (OUTPATIENT)
Dept: LAB | Facility: LAB | Age: 69
End: 2024-10-09
Payer: COMMERCIAL

## 2024-10-09 ENCOUNTER — TELEPHONE (OUTPATIENT)
Dept: PRIMARY CARE | Facility: CLINIC | Age: 69
End: 2024-10-09

## 2024-10-09 DIAGNOSIS — D70.8 OTHER NEUTROPENIA (CMS-HCC): ICD-10-CM

## 2024-10-09 LAB
ALBUMIN SERPL BCP-MCNC: 4.1 G/DL (ref 3.4–5)
ALP SERPL-CCNC: 76 U/L (ref 33–136)
ALT SERPL W P-5'-P-CCNC: 44 U/L (ref 7–45)
ANION GAP SERPL CALC-SCNC: 13 MMOL/L (ref 10–20)
AST SERPL W P-5'-P-CCNC: 56 U/L (ref 9–39)
BASOPHILS # BLD AUTO: 0.05 X10*3/UL (ref 0–0.1)
BASOPHILS NFR BLD AUTO: 1.7 %
BILIRUB SERPL-MCNC: 0.5 MG/DL (ref 0–1.2)
BUN SERPL-MCNC: 17 MG/DL (ref 6–23)
CALCIUM SERPL-MCNC: 9.1 MG/DL (ref 8.6–10.3)
CHLORIDE SERPL-SCNC: 100 MMOL/L (ref 98–107)
CO2 SERPL-SCNC: 30 MMOL/L (ref 21–32)
CREAT SERPL-MCNC: 0.74 MG/DL (ref 0.5–1.05)
EGFRCR SERPLBLD CKD-EPI 2021: 87 ML/MIN/1.73M*2
EOSINOPHIL # BLD AUTO: 0.14 X10*3/UL (ref 0–0.7)
EOSINOPHIL NFR BLD AUTO: 4.8 %
ERYTHROCYTE [DISTWIDTH] IN BLOOD BY AUTOMATED COUNT: 12.5 % (ref 11.5–14.5)
GLUCOSE SERPL-MCNC: 87 MG/DL (ref 74–99)
HCT VFR BLD AUTO: 35.1 % (ref 36–46)
HGB BLD-MCNC: 11.5 G/DL (ref 12–16)
IMM GRANULOCYTES # BLD AUTO: 0.01 X10*3/UL (ref 0–0.7)
IMM GRANULOCYTES NFR BLD AUTO: 0.3 % (ref 0–0.9)
LYMPHOCYTES # BLD AUTO: 0.87 X10*3/UL (ref 1.2–4.8)
LYMPHOCYTES NFR BLD AUTO: 29.9 %
MCH RBC QN AUTO: 32 PG (ref 26–34)
MCHC RBC AUTO-ENTMCNC: 32.8 G/DL (ref 32–36)
MCV RBC AUTO: 98 FL (ref 80–100)
MONOCYTES # BLD AUTO: 0.32 X10*3/UL (ref 0.1–1)
MONOCYTES NFR BLD AUTO: 11 %
NEUTROPHILS # BLD AUTO: 1.52 X10*3/UL (ref 1.2–7.7)
NEUTROPHILS NFR BLD AUTO: 52.3 %
NRBC BLD-RTO: 0 /100 WBCS (ref 0–0)
PLATELET # BLD AUTO: 362 X10*3/UL (ref 150–450)
POTASSIUM SERPL-SCNC: 4.1 MMOL/L (ref 3.5–5.3)
PROT SERPL-MCNC: 6.5 G/DL (ref 6.4–8.2)
RBC # BLD AUTO: 3.59 X10*6/UL (ref 4–5.2)
SODIUM SERPL-SCNC: 139 MMOL/L (ref 136–145)
VIT B12 SERPL-MCNC: 831 PG/ML (ref 211–911)
WBC # BLD AUTO: 2.9 X10*3/UL (ref 4.4–11.3)

## 2024-10-09 PROCEDURE — 82607 VITAMIN B-12: CPT

## 2024-10-09 PROCEDURE — 36415 COLL VENOUS BLD VENIPUNCTURE: CPT

## 2024-10-09 PROCEDURE — 80053 COMPREHEN METABOLIC PANEL: CPT

## 2024-10-09 PROCEDURE — 85025 COMPLETE CBC W/AUTO DIFF WBC: CPT

## 2024-10-11 ENCOUNTER — APPOINTMENT (OUTPATIENT)
Dept: PRIMARY CARE | Facility: CLINIC | Age: 69
End: 2024-10-11
Payer: COMMERCIAL

## 2024-10-11 ENCOUNTER — HOSPITAL ENCOUNTER (OUTPATIENT)
Dept: RADIOLOGY | Facility: CLINIC | Age: 69
Discharge: HOME | End: 2024-10-11
Payer: COMMERCIAL

## 2024-10-11 VITALS
DIASTOLIC BLOOD PRESSURE: 84 MMHG | WEIGHT: 115 LBS | HEIGHT: 66 IN | HEART RATE: 76 BPM | BODY MASS INDEX: 18.48 KG/M2 | RESPIRATION RATE: 12 BRPM | SYSTOLIC BLOOD PRESSURE: 134 MMHG

## 2024-10-11 DIAGNOSIS — Z12.31 SCREENING MAMMOGRAM FOR BREAST CANCER: ICD-10-CM

## 2024-10-11 DIAGNOSIS — G89.29 CHRONIC PAIN OF BOTH KNEES: ICD-10-CM

## 2024-10-11 DIAGNOSIS — M25.562 CHRONIC PAIN OF BOTH KNEES: ICD-10-CM

## 2024-10-11 DIAGNOSIS — M25.561 CHRONIC PAIN OF BOTH KNEES: ICD-10-CM

## 2024-10-11 DIAGNOSIS — Z00.00 HEALTHCARE MAINTENANCE: Primary | ICD-10-CM

## 2024-10-11 PROCEDURE — 1124F ACP DISCUSS-NO DSCNMKR DOCD: CPT | Performed by: FAMILY MEDICINE

## 2024-10-11 PROCEDURE — 3008F BODY MASS INDEX DOCD: CPT | Performed by: FAMILY MEDICINE

## 2024-10-11 PROCEDURE — 73562 X-RAY EXAM OF KNEE 3: CPT | Mod: 50

## 2024-10-11 PROCEDURE — 1159F MED LIST DOCD IN RCRD: CPT | Performed by: FAMILY MEDICINE

## 2024-10-11 PROCEDURE — 77067 SCR MAMMO BI INCL CAD: CPT

## 2024-10-11 PROCEDURE — 3075F SYST BP GE 130 - 139MM HG: CPT | Performed by: FAMILY MEDICINE

## 2024-10-11 PROCEDURE — 3079F DIAST BP 80-89 MM HG: CPT | Performed by: FAMILY MEDICINE

## 2024-10-11 PROCEDURE — 1036F TOBACCO NON-USER: CPT | Performed by: FAMILY MEDICINE

## 2024-10-11 PROCEDURE — 1170F FXNL STATUS ASSESSED: CPT | Performed by: FAMILY MEDICINE

## 2024-10-11 PROCEDURE — G0439 PPPS, SUBSEQ VISIT: HCPCS | Performed by: FAMILY MEDICINE

## 2024-10-11 ASSESSMENT — ACTIVITIES OF DAILY LIVING (ADL)
MANAGING_FINANCES: INDEPENDENT
TAKING_MEDICATION: INDEPENDENT
GROCERY_SHOPPING: INDEPENDENT
DRESSING: INDEPENDENT
BATHING: INDEPENDENT
DOING_HOUSEWORK: INDEPENDENT

## 2024-10-11 ASSESSMENT — ENCOUNTER SYMPTOMS
LOSS OF SENSATION IN FEET: 0
SINUS PRESSURE: 0
CHILLS: 0
DYSURIA: 0
SHORTNESS OF BREATH: 0
NERVOUS/ANXIOUS: 1
SLEEP DISTURBANCE: 0
DYSPHORIC MOOD: 1
BRUISES/BLEEDS EASILY: 0
DEPRESSION: 1
NAUSEA: 0
DIFFICULTY URINATING: 0
ABDOMINAL DISTENTION: 0
WHEEZING: 0
HEADACHES: 0
APPETITE CHANGE: 0
CHEST TIGHTNESS: 0
MYALGIAS: 0
FEVER: 0
FATIGUE: 0
LIGHT-HEADEDNESS: 0
DIARRHEA: 0
ADENOPATHY: 0
ABDOMINAL PAIN: 0
OCCASIONAL FEELINGS OF UNSTEADINESS: 1
ARTHRALGIAS: 1

## 2024-10-11 ASSESSMENT — PATIENT HEALTH QUESTIONNAIRE - PHQ9
SUM OF ALL RESPONSES TO PHQ9 QUESTIONS 1 AND 2: 3
8. MOVING OR SPEAKING SO SLOWLY THAT OTHER PEOPLE COULD HAVE NOTICED. OR THE OPPOSITE, BEING SO FIGETY OR RESTLESS THAT YOU HAVE BEEN MOVING AROUND A LOT MORE THAN USUAL: NOT AT ALL
6. FEELING BAD ABOUT YOURSELF - OR THAT YOU ARE A FAILURE OR HAVE LET YOURSELF OR YOUR FAMILY DOWN: MORE THAN HALF THE DAYS
SUM OF ALL RESPONSES TO PHQ QUESTIONS 1-9: 9
2. FEELING DOWN, DEPRESSED OR HOPELESS: NEARLY EVERY DAY
9. THOUGHTS THAT YOU WOULD BE BETTER OFF DEAD, OR OF HURTING YOURSELF: NOT AT ALL
1. LITTLE INTEREST OR PLEASURE IN DOING THINGS: NOT AT ALL
10. IF YOU CHECKED OFF ANY PROBLEMS, HOW DIFFICULT HAVE THESE PROBLEMS MADE IT FOR YOU TO DO YOUR WORK, TAKE CARE OF THINGS AT HOME, OR GET ALONG WITH OTHER PEOPLE: SOMEWHAT DIFFICULT
3. TROUBLE FALLING OR STAYING ASLEEP OR SLEEPING TOO MUCH: SEVERAL DAYS
7. TROUBLE CONCENTRATING ON THINGS, SUCH AS READING THE NEWSPAPER OR WATCHING TELEVISION: SEVERAL DAYS
5. POOR APPETITE OR OVEREATING: NOT AT ALL
4. FEELING TIRED OR HAVING LITTLE ENERGY: MORE THAN HALF THE DAYS

## 2024-10-11 NOTE — PROGRESS NOTES
"Subjective   Patient ID: Valeria Watkins is a 69 y.o. female who presents for Medicare Annual Wellness Visit Subsequent.  PMHX, PSHx, Fam hx, and Social hx reviewed.   New concerns - worsening R knee pain for the past 1.5months, causing limp. Has not seen ortho or had imaging in 6 yrs.  Vaccines RSV vaccine recommended at pharmacy  Dentist seen at least yearly yes  Vision concerns no  Hearing concerns no  Diet is overall healthy, takes Ensure as needed.  Smoker - no  Lung CT screening - NA  Alcohol use - 6 drinks per week  Exercising 4-6 days per week.   Colonoscopy 2024  Mammogram 2022  Last PAP NA  DEXA 2022, normal  ACP - advance directives, code status, and DPOA documentation discussed         Review of Systems   Constitutional:  Negative for appetite change, chills, fatigue and fever.   HENT:  Negative for congestion, ear pain and sinus pressure.    Eyes:  Negative for visual disturbance.   Respiratory:  Negative for chest tightness, shortness of breath and wheezing.    Cardiovascular:  Negative for chest pain.   Gastrointestinal:  Negative for abdominal distention, abdominal pain, diarrhea and nausea.   Genitourinary:  Negative for difficulty urinating, dysuria and pelvic pain.   Musculoskeletal:  Positive for arthralgias. Negative for myalgias.   Skin:  Negative for rash.   Allergic/Immunologic: Negative for immunocompromised state.   Neurological:  Negative for light-headedness and headaches.   Hematological:  Negative for adenopathy. Does not bruise/bleed easily.   Psychiatric/Behavioral:  Positive for dysphoric mood. Negative for sleep disturbance. The patient is nervous/anxious.        Objective   /84   Pulse 76   Resp 12   Ht 1.676 m (5' 6\")   Wt 52.2 kg (115 lb)   BMI 18.56 kg/m²    Physical Exam  Constitutional:       General: She is not in acute distress.     Appearance: Normal appearance. She is not ill-appearing.   HENT:      Head: Normocephalic and atraumatic.      Right Ear: Tympanic " membrane, ear canal and external ear normal.      Left Ear: Tympanic membrane, ear canal and external ear normal.      Nose: Nose normal.      Mouth/Throat:      Mouth: Mucous membranes are moist.      Pharynx: No oropharyngeal exudate or posterior oropharyngeal erythema.   Eyes:      Extraocular Movements: Extraocular movements intact.      Conjunctiva/sclera: Conjunctivae normal.      Pupils: Pupils are equal, round, and reactive to light.   Neck:      Thyroid: No thyroid mass or thyromegaly.      Vascular: No carotid bruit.   Cardiovascular:      Rate and Rhythm: Normal rate and regular rhythm.      Heart sounds: Normal heart sounds. No murmur heard.  Pulmonary:      Breath sounds: Normal breath sounds. No wheezing, rhonchi or rales.   Abdominal:      General: Bowel sounds are normal. There is no distension.      Palpations: Abdomen is soft. There is no mass.      Tenderness: There is no abdominal tenderness.   Musculoskeletal:         General: No swelling or deformity.      Cervical back: Neck supple. No tenderness.      Comments: R knee has restrict ROM on flexion and extension. +joint line and MCL tenderness.   Lymphadenopathy:      Cervical: No cervical adenopathy.   Skin:     General: Skin is warm and dry.      Findings: No lesion or rash.   Neurological:      Mental Status: She is alert and oriented to person, place, and time.      Sensory: No sensory deficit.      Motor: No weakness.      Coordination: Coordination normal.      Deep Tendon Reflexes: Reflexes normal.   Psychiatric:         Mood and Affect: Mood normal.         Behavior: Behavior normal.         Judgment: Judgment normal.       Medicare Wellness Billing Compliance Satisfied    *This is a visual tool to show completion of required items on the day of the visit. Green checks will only appear on the date of visit.    Review all medications by prescribing practitioner or clinical pharmacist (such as prescriptions, OTCs, herbal therapies and  supplements) documented in the medical record    Past Medical, Surgical, and Family History reviewed and updated in chart    Tobacco Use Reviewed    Alcohol Use Reviewed    Illicit Drug Use Reviewed    PHQ2/9    Falls in Last Year Reviewed    Home Safety Risk Factors Reviewed    Cognitive Impairment Reviewed    Patient Self Assessment and Health Status    Current Diet Reviewed    Exercise Frequency    ADL - Hearing Impairment    ADL - Bathing    ADL - Dressing    ADL - Walks in Home    IADL - Managing Finances    IADL - Grocery Shopping    IADL - Taking Medications    IADL - Doing Housework        Assessment/Plan   Diagnoses and all orders for this visit:  Healthcare maintenance - RSV vaccine recommended at pharmacy. Ordering Mammogram. DEXA and Colonoscopy current.   Worsening R >L knee pain - checking XR and referring to Dr Melton.    Follow up in December, 30mins

## 2024-10-11 NOTE — PATIENT INSTRUCTIONS

## 2024-10-11 NOTE — PROGRESS NOTES
"Subjective   Reason for Visit: Valeria Watkins is an 69 y.o. female here for a Medicare Wellness visit.     Past Medical, Surgical, and Family History reviewed and updated in chart.    Reviewed all medications by prescribing practitioner or clinical pharmacist (such as prescriptions, OTCs, herbal therapies and supplements) and documented in the medical record.    HPI    Patient Care Team:  Rik Short MD as PCP - [facility]  Yanci Maldonado MD as Consulting Physician (Hematology and Oncology)     Review of Systems    Objective   Vitals:  /84   Pulse 76   Resp 12   Ht 1.676 m (5' 6\")   Wt 52.2 kg (115 lb)   BMI 18.56 kg/m²       Physical Exam    Assessment & Plan              "

## 2024-10-15 ENCOUNTER — APPOINTMENT (OUTPATIENT)
Dept: RADIOLOGY | Facility: CLINIC | Age: 69
End: 2024-10-15
Payer: COMMERCIAL

## 2024-10-30 ENCOUNTER — APPOINTMENT (OUTPATIENT)
Dept: ORTHOPEDIC SURGERY | Facility: CLINIC | Age: 69
End: 2024-10-30
Payer: COMMERCIAL

## 2024-11-20 ENCOUNTER — APPOINTMENT (OUTPATIENT)
Dept: ORTHOPEDIC SURGERY | Facility: CLINIC | Age: 69
End: 2024-11-20
Payer: COMMERCIAL

## 2024-11-20 DIAGNOSIS — M17.10 ARTHRITIS OF KNEE: Primary | ICD-10-CM

## 2024-11-20 PROCEDURE — 99204 OFFICE O/P NEW MOD 45 MIN: CPT | Performed by: ORTHOPAEDIC SURGERY

## 2024-11-20 PROCEDURE — 1036F TOBACCO NON-USER: CPT | Performed by: ORTHOPAEDIC SURGERY

## 2024-11-20 PROCEDURE — 1159F MED LIST DOCD IN RCRD: CPT | Performed by: ORTHOPAEDIC SURGERY

## 2024-11-20 PROCEDURE — 20610 DRAIN/INJ JOINT/BURSA W/O US: CPT | Performed by: ORTHOPAEDIC SURGERY

## 2024-11-20 PROCEDURE — 1123F ACP DISCUSS/DSCN MKR DOCD: CPT | Performed by: ORTHOPAEDIC SURGERY

## 2024-11-20 PROCEDURE — 1125F AMNT PAIN NOTED PAIN PRSNT: CPT | Performed by: ORTHOPAEDIC SURGERY

## 2024-11-20 PROCEDURE — 1160F RVW MEDS BY RX/DR IN RCRD: CPT | Performed by: ORTHOPAEDIC SURGERY

## 2024-11-20 RX ORDER — TRIAMCINOLONE ACETONIDE 40 MG/ML
1 INJECTION, SUSPENSION INTRA-ARTICULAR; INTRAMUSCULAR
Status: COMPLETED | OUTPATIENT
Start: 2024-11-20 | End: 2024-11-20

## 2024-11-20 ASSESSMENT — PAIN SCALES - GENERAL: PAINLEVEL_OUTOF10: 8

## 2024-11-20 ASSESSMENT — PAIN - FUNCTIONAL ASSESSMENT: PAIN_FUNCTIONAL_ASSESSMENT: 0-10

## 2024-11-20 NOTE — LETTER
November 20, 2024     Rik Short MD  [address]  [place] [address]    Patient: Valeria Watkins   YOB: 1955   Date of Visit: 11/20/2024       Dear Dr. Rik Short MD:    Thank you for referring Valeria Watkins to me for evaluation. Below are my notes for this consultation.  If you have questions, please do not hesitate to call me. I look forward to following your patient along with you.       Sincerely,     Buck Melton MD      CC: No Recipients  ______________________________________________________________________________________    This is a consultation from Dr. Rik Short MD for   Chief Complaint   Patient presents with   • Right Knee - Pain, Edema       This is a 69 y.o. female who presents for evaluation of right knee pain.  This is a chronic issue for the patient said right knee pain for a while.  She complains of a sharp pain over the medial and anterior knee worse with walking improving with rest.  This has been recently exacerbated in the last several weeks.  She has never had injections or surgery on that knee.  No numbness no tingling no fevers no chills no shooting pain down the leg.  She takes Tylenol.  She is used multiple topical over-the-counter medications.    Physical Exam    There has been no interval change in this patient's past medical, surgical, medications, allergies, family history or social history since the most recent visit to a provider within our department. 14 point review of systems was performed, reviewed, and negative except for pertinent positives documented in the history of present illness.     Constitutional: well developed, well nourished female in no acute distress  Psychiatric: normal mood, appropriate affect  Eyes: sclera anicteric  HENT: normocephalic/atraumatic  CV: regular rate and rhythm   Respiratory: non labored breathing  Integumentary: no rash  Neurological: moves all  extremities    Right knee exam: skin intact no lacerations or abrations.  1+ effusion.  Tender medial and lateral joint line. negative log roll negative patellar grind. ROM 0-120. stable to varus and valgus stress at 0 and 30 degrees. negative lachman negative posterior drawer negative justina. 5/5 ehl/fhl/gs/ta. silt s/s/sp/dp/t. 2+ dp/pt        Xrays were ordered by me, they were reviewed and independently interpreted by me today, they show moderate degenerative changes with joint space narrowing.    L Inj/Asp: R knee on 11/20/2024 9:15 AM  Indications: pain and joint swelling  Details: 22 G needle, anterolateral approach  Medications: 1 mL triamcinolone acetonide 40 mg/mL    Discussion:  I discussed the conservative treatment options for knee osteoarthritis including but not limited to physical therapy, oral NSAIDS, activity and lifestyle modification, and corticosteroid injections. Pt has elected to undergo a cortisone injection today. I have explained the risk and benefits of an injection including the possibility of joint infection, bleeding, damage to cartilage, allergic reaction. Patient verbalized understanding and gave verbal consent wishes to proceed with a intra-articular cortisone injection for their knee.    Procedure:  After discussing the risk and benefits of the procedure, we proceeded with an intra-articular right knee injection. We discussed the risks and benefits and potential morbidity related to the treatment, and to the prescription medication administered in the injection    With the patient's informed verbal consent, the right knee was prepped in standard sterile fashion with Chlorhexidine. The skin was then anesthetized with ethyl chloride spray and cleaned again with Chlorhexidine. The knee was then apirated/injected with a prefilled 20-gauge syringe of 40 mg Kenalog + 4 ml Lidocaine using the lateral approach without complications.  The patient tolerated this well and felt immediate  initial relief of symptoms. A bandaid was applied and the patient ambulated out of the clinic on ther own accord without difficulty. Patient was instructed to avoid physical activity for 24-48 hours to prevent the knees from swelling and may ice the knees as tolerated. Patient should contact the office if any signs of of infection appear: redness, fever, chills, drainage, swelling or warmth to the knees.  Pt understands that the injections can be repeated no sooner than 3 months.    Procedure, treatment alternatives, risks and benefits explained, specific risks discussed. Consent was given by the patient. Immediately prior to procedure a time out was called to verify the correct patient, procedure, equipment, support staff and site/side marked as required. Patient was prepped and draped in the usual sterile fashion.             Impression/Plan: This is a 69 y.o. female with moderate right knee arthritis.  I had an in depth discussion with the patient regarding treatment options for arthritis and their relative risks and benefits. We reviewed surgical and nonsurgical option for treatment. Treatments include anti inflammatory medications, physical therapy, weight loss, activity modification, use of assistive devices, injection therapies. We discussed current prescriptions and risks and benefits of continuation of prescription medication as apporpriate. We discussed that arthritis is often progressive over time, an in end stage arthritis surgical interventions can be considered, including arthroplasty. All questions were answered and the patient voiced their understanding.  I will see her back as needed    BMI Readings from Last 1 Encounters:   10/11/24 18.56 kg/m²      Lab Results   Component Value Date    CREATININE 0.74 10/09/2024     Tobacco Use: Low Risk  (11/20/2024)    Patient History    • Smoking Tobacco Use: Never    • Smokeless Tobacco Use: Never    • Passive Exposure: Not on file      Computed MELD 3.0  "unavailable. One or more values for this score either were not found within the given timeframe or did not fit some other criterion.  Computed MELD-Na unavailable. One or more values for this score either were not found within the given timeframe or did not fit some other criterion.       Lab Results   Component Value Date    HGBA1C 5.3 10/06/2023     No results found for: \"STAPHMRSASCR\"  "

## 2024-11-20 NOTE — PROGRESS NOTES
This is a consultation from Dr. Rik Short MD for   Chief Complaint   Patient presents with    Right Knee - Pain, Edema       This is a 69 y.o. female who presents for evaluation of right knee pain.  This is a chronic issue for the patient said right knee pain for a while.  She complains of a sharp pain over the medial and anterior knee worse with walking improving with rest.  This has been recently exacerbated in the last several weeks.  She has never had injections or surgery on that knee.  No numbness no tingling no fevers no chills no shooting pain down the leg.  She takes Tylenol.  She is used multiple topical over-the-counter medications.    Physical Exam    There has been no interval change in this patient's past medical, surgical, medications, allergies, family history or social history since the most recent visit to a provider within our department. 14 point review of systems was performed, reviewed, and negative except for pertinent positives documented in the history of present illness.     Constitutional: well developed, well nourished female in no acute distress  Psychiatric: normal mood, appropriate affect  Eyes: sclera anicteric  HENT: normocephalic/atraumatic  CV: regular rate and rhythm   Respiratory: non labored breathing  Integumentary: no rash  Neurological: moves all extremities    Right knee exam: skin intact no lacerations or abrations.  1+ effusion.  Tender medial and lateral joint line. negative log roll negative patellar grind. ROM 0-120. stable to varus and valgus stress at 0 and 30 degrees. negative lachman negative posterior drawer negative justina. 5/5 ehl/fhl/gs/ta. silt s/s/sp/dp/t. 2+ dp/pt        Xrays were ordered by me, they were reviewed and independently interpreted by me today, they show moderate degenerative changes with joint space narrowing.    L Inj/Asp: R knee on 11/20/2024 9:15 AM  Indications: pain and joint swelling  Details: 22 G needle, anterolateral  approach  Medications: 1 mL triamcinolone acetonide 40 mg/mL    Discussion:  I discussed the conservative treatment options for knee osteoarthritis including but not limited to physical therapy, oral NSAIDS, activity and lifestyle modification, and corticosteroid injections. Pt has elected to undergo a cortisone injection today. I have explained the risk and benefits of an injection including the possibility of joint infection, bleeding, damage to cartilage, allergic reaction. Patient verbalized understanding and gave verbal consent wishes to proceed with a intra-articular cortisone injection for their knee.    Procedure:  After discussing the risk and benefits of the procedure, we proceeded with an intra-articular right knee injection. We discussed the risks and benefits and potential morbidity related to the treatment, and to the prescription medication administered in the injection    With the patient's informed verbal consent, the right knee was prepped in standard sterile fashion with Chlorhexidine. The skin was then anesthetized with ethyl chloride spray and cleaned again with Chlorhexidine. The knee was then apirated/injected with a prefilled 20-gauge syringe of 40 mg Kenalog + 4 ml Lidocaine using the lateral approach without complications.  The patient tolerated this well and felt immediate initial relief of symptoms. A bandaid was applied and the patient ambulated out of the clinic on ther own accord without difficulty. Patient was instructed to avoid physical activity for 24-48 hours to prevent the knees from swelling and may ice the knees as tolerated. Patient should contact the office if any signs of of infection appear: redness, fever, chills, drainage, swelling or warmth to the knees.  Pt understands that the injections can be repeated no sooner than 3 months.    Procedure, treatment alternatives, risks and benefits explained, specific risks discussed. Consent was given by the patient. Immediately  "prior to procedure a time out was called to verify the correct patient, procedure, equipment, support staff and site/side marked as required. Patient was prepped and draped in the usual sterile fashion.             Impression/Plan: This is a 69 y.o. female with moderate right knee arthritis.  I had an in depth discussion with the patient regarding treatment options for arthritis and their relative risks and benefits. We reviewed surgical and nonsurgical option for treatment. Treatments include anti inflammatory medications, physical therapy, weight loss, activity modification, use of assistive devices, injection therapies. We discussed current prescriptions and risks and benefits of continuation of prescription medication as apporpriate. We discussed that arthritis is often progressive over time, an in end stage arthritis surgical interventions can be considered, including arthroplasty. All questions were answered and the patient voiced their understanding.  I will see her back as needed    BMI Readings from Last 1 Encounters:   10/11/24 18.56 kg/m²      Lab Results   Component Value Date    CREATININE 0.74 10/09/2024     Tobacco Use: Low Risk  (11/20/2024)    Patient History     Smoking Tobacco Use: Never     Smokeless Tobacco Use: Never     Passive Exposure: Not on file      Computed MELD 3.0 unavailable. One or more values for this score either were not found within the given timeframe or did not fit some other criterion.  Computed MELD-Na unavailable. One or more values for this score either were not found within the given timeframe or did not fit some other criterion.       Lab Results   Component Value Date    HGBA1C 5.3 10/06/2023     No results found for: \"STAPHMRSASCR\"  "

## 2024-11-26 ENCOUNTER — APPOINTMENT (OUTPATIENT)
Dept: PRIMARY CARE | Facility: CLINIC | Age: 69
End: 2024-11-26
Payer: COMMERCIAL

## 2024-12-19 DIAGNOSIS — J30.9 ALLERGIC RHINITIS, UNSPECIFIED SEASONALITY, UNSPECIFIED TRIGGER: ICD-10-CM

## 2024-12-19 DIAGNOSIS — I10 PRIMARY HYPERTENSION: ICD-10-CM

## 2024-12-19 RX ORDER — ATENOLOL AND CHLORTHALIDONE TABLET 50; 25 MG/1; MG/1
1 TABLET ORAL DAILY
Qty: 90 TABLET | Refills: 0 | Status: SHIPPED | OUTPATIENT
Start: 2024-12-19

## 2024-12-19 RX ORDER — LORATADINE 10 MG/1
10 TABLET ORAL DAILY
Qty: 90 TABLET | Refills: 0 | Status: SHIPPED | OUTPATIENT
Start: 2024-12-19

## 2025-01-06 DIAGNOSIS — E78.00 ELEVATED LDL CHOLESTEROL LEVEL: ICD-10-CM

## 2025-01-06 RX ORDER — ROSUVASTATIN CALCIUM 10 MG/1
10 TABLET, COATED ORAL DAILY
Qty: 100 TABLET | Refills: 1 | Status: SHIPPED | OUTPATIENT
Start: 2025-01-06 | End: 2025-07-05

## 2025-01-10 DIAGNOSIS — E78.00 ELEVATED LDL CHOLESTEROL LEVEL: Primary | ICD-10-CM

## 2025-01-13 ENCOUNTER — LAB (OUTPATIENT)
Dept: LAB | Facility: LAB | Age: 70
End: 2025-01-13
Payer: COMMERCIAL

## 2025-01-13 DIAGNOSIS — E78.00 ELEVATED LDL CHOLESTEROL LEVEL: ICD-10-CM

## 2025-01-13 DIAGNOSIS — R79.89 ELEVATED LFTS: Primary | ICD-10-CM

## 2025-01-13 LAB
ALBUMIN SERPL BCP-MCNC: 4.3 G/DL (ref 3.4–5)
ALP SERPL-CCNC: 106 U/L (ref 33–136)
ALT SERPL W P-5'-P-CCNC: 65 U/L (ref 7–45)
ANION GAP SERPL CALC-SCNC: 16 MMOL/L (ref 10–20)
AST SERPL W P-5'-P-CCNC: 69 U/L (ref 9–39)
BILIRUB SERPL-MCNC: 0.8 MG/DL (ref 0–1.2)
BUN SERPL-MCNC: 8 MG/DL (ref 6–23)
CALCIUM SERPL-MCNC: 10 MG/DL (ref 8.6–10.6)
CHLORIDE SERPL-SCNC: 99 MMOL/L (ref 98–107)
CHOLEST SERPL-MCNC: 183 MG/DL (ref 0–199)
CHOLESTEROL/HDL RATIO: 1.7
CO2 SERPL-SCNC: 29 MMOL/L (ref 21–32)
CREAT SERPL-MCNC: 0.85 MG/DL (ref 0.5–1.05)
EGFRCR SERPLBLD CKD-EPI 2021: 74 ML/MIN/1.73M*2
GLUCOSE SERPL-MCNC: 78 MG/DL (ref 74–99)
HDLC SERPL-MCNC: 106 MG/DL
LDLC SERPL CALC-MCNC: 66 MG/DL
NON HDL CHOLESTEROL: 77 MG/DL (ref 0–149)
POTASSIUM SERPL-SCNC: 4.3 MMOL/L (ref 3.5–5.3)
PROT SERPL-MCNC: 7 G/DL (ref 6.4–8.2)
SODIUM SERPL-SCNC: 140 MMOL/L (ref 136–145)
TRIGL SERPL-MCNC: 57 MG/DL (ref 0–149)
VLDL: 11 MG/DL (ref 0–40)

## 2025-01-13 PROCEDURE — 80053 COMPREHEN METABOLIC PANEL: CPT

## 2025-01-13 PROCEDURE — 80061 LIPID PANEL: CPT

## 2025-01-14 ENCOUNTER — APPOINTMENT (OUTPATIENT)
Dept: PRIMARY CARE | Facility: CLINIC | Age: 70
End: 2025-01-14
Payer: COMMERCIAL

## 2025-01-14 VITALS
RESPIRATION RATE: 12 BRPM | HEART RATE: 100 BPM | WEIGHT: 118 LBS | HEIGHT: 66 IN | BODY MASS INDEX: 18.96 KG/M2 | DIASTOLIC BLOOD PRESSURE: 76 MMHG | SYSTOLIC BLOOD PRESSURE: 132 MMHG

## 2025-01-14 DIAGNOSIS — I51.9 DIASTOLIC DYSFUNCTION, LEFT VENTRICLE: ICD-10-CM

## 2025-01-14 DIAGNOSIS — E78.00 ELEVATED LDL CHOLESTEROL LEVEL: ICD-10-CM

## 2025-01-14 DIAGNOSIS — F25.9 SCHIZOAFFECTIVE DISORDER, UNSPECIFIED TYPE: ICD-10-CM

## 2025-01-14 DIAGNOSIS — M25.562 CHRONIC PAIN OF BOTH KNEES: ICD-10-CM

## 2025-01-14 DIAGNOSIS — I10 PRIMARY HYPERTENSION: Primary | ICD-10-CM

## 2025-01-14 DIAGNOSIS — G89.29 CHRONIC PAIN OF BOTH KNEES: ICD-10-CM

## 2025-01-14 DIAGNOSIS — M15.4 EROSIVE OSTEOARTHRITIS OF HAND: ICD-10-CM

## 2025-01-14 DIAGNOSIS — F98.8 ATTENTION DEFICIT DISORDER, UNSPECIFIED TYPE: ICD-10-CM

## 2025-01-14 DIAGNOSIS — R74.01 ELEVATED AST (SGOT): ICD-10-CM

## 2025-01-14 DIAGNOSIS — F33.41 RECURRENT MAJOR DEPRESSIVE DISORDER, IN PARTIAL REMISSION (CMS-HCC): ICD-10-CM

## 2025-01-14 DIAGNOSIS — M25.561 CHRONIC PAIN OF BOTH KNEES: ICD-10-CM

## 2025-01-14 DIAGNOSIS — R93.1 AGATSTON CAC SCORE 200-399: ICD-10-CM

## 2025-01-14 PROCEDURE — 99214 OFFICE O/P EST MOD 30 MIN: CPT | Performed by: FAMILY MEDICINE

## 2025-01-14 PROCEDURE — 1123F ACP DISCUSS/DSCN MKR DOCD: CPT | Performed by: FAMILY MEDICINE

## 2025-01-14 PROCEDURE — 1159F MED LIST DOCD IN RCRD: CPT | Performed by: FAMILY MEDICINE

## 2025-01-14 PROCEDURE — 1036F TOBACCO NON-USER: CPT | Performed by: FAMILY MEDICINE

## 2025-01-14 PROCEDURE — 3075F SYST BP GE 130 - 139MM HG: CPT | Performed by: FAMILY MEDICINE

## 2025-01-14 PROCEDURE — 3078F DIAST BP <80 MM HG: CPT | Performed by: FAMILY MEDICINE

## 2025-01-14 PROCEDURE — 3008F BODY MASS INDEX DOCD: CPT | Performed by: FAMILY MEDICINE

## 2025-01-14 RX ORDER — ATENOLOL AND CHLORTHALIDONE TABLET 50; 25 MG/1; MG/1
1 TABLET ORAL DAILY
Qty: 100 TABLET | Refills: 1 | Status: SHIPPED | OUTPATIENT
Start: 2025-01-14

## 2025-01-14 RX ORDER — ROSUVASTATIN CALCIUM 10 MG/1
10 TABLET, COATED ORAL DAILY
Qty: 100 TABLET | Refills: 1 | Status: SHIPPED | OUTPATIENT
Start: 2025-01-14 | End: 2025-07-13

## 2025-01-14 RX ORDER — AMLODIPINE BESYLATE 5 MG/1
5 TABLET ORAL DAILY
Qty: 100 TABLET | Refills: 1 | Status: SHIPPED | OUTPATIENT
Start: 2025-01-14

## 2025-01-14 RX ORDER — CHOLECALCIFEROL (VITAMIN D3) 25 MCG
1000 TABLET ORAL DAILY
COMMUNITY

## 2025-01-14 ASSESSMENT — ENCOUNTER SYMPTOMS
SHORTNESS OF BREATH: 0
SINUS PRESSURE: 0
DYSPHORIC MOOD: 0
LIGHT-HEADEDNESS: 0
ADENOPATHY: 0
CHEST TIGHTNESS: 0
DYSURIA: 0
APPETITE CHANGE: 0
FEVER: 0
CHILLS: 0
NAUSEA: 0
HEADACHES: 0
ARTHRALGIAS: 0
SLEEP DISTURBANCE: 0
ABDOMINAL PAIN: 0
NERVOUS/ANXIOUS: 0
MYALGIAS: 0
WHEEZING: 0
FATIGUE: 0
ABDOMINAL DISTENTION: 0
BRUISES/BLEEDS EASILY: 0
DIFFICULTY URINATING: 0
DIARRHEA: 0

## 2025-01-14 NOTE — PROGRESS NOTES
Subjective   Patient ID: Valeria Watkins is a 69 y.o. female who presents for Follow-up.  Pt has chronic, stable CAD, diastolic dysfunction, and  HTN.  Pt is taking Amlo, [place]/Chlorthalidione. Tolerating well.  Exercising 2-3 days per week   Low sodium diet is being followed.   Is not monitoring home blood pressures.  Denies HA, vision changes or CP.     Pt has Dyslipidemia.   Lipid panel showed LDL 66.  Currently taking Crestor and is tolerating well without muscle pains or weakness.     LFTS on labs arre further elevated, she still needs to schedule appt with Dr Brush ( sees her son). She reports having cut back alcohol use to 3 drinks per week.    Arthritis worst in hands and knees is stable with Tylenol as needed. Sees Dr Melton every 6 months,     Pt has chronic and stable depression / anxiety. Has multiple family stressors,sister in rehab post spinal stenosis.   Pt has supportive family/friends.   Pt not seeing a counselor.   Taking Elavil, Diazepam, and Ritalin and it is helping.   Mood, energy, motivation, interests, sleep and appetite are adequate. Anxiety is stable.  Pt denies suicidal ideations.             Review of Systems   Constitutional:  Negative for appetite change, chills, fatigue and fever.   HENT:  Negative for congestion, ear pain and sinus pressure.    Eyes:  Negative for visual disturbance.   Respiratory:  Negative for chest tightness, shortness of breath and wheezing.    Cardiovascular:  Negative for chest pain.   Gastrointestinal:  Negative for abdominal distention, abdominal pain, diarrhea and nausea.   Genitourinary:  Negative for difficulty urinating, dysuria and pelvic pain.   Musculoskeletal:  Negative for arthralgias and myalgias.   Skin:  Negative for rash.   Allergic/Immunologic: Negative for immunocompromised state.   Neurological:  Negative for light-headedness and headaches.   Hematological:  Negative for adenopathy. Does not bruise/bleed easily.   Psychiatric/Behavioral:   "Negative for dysphoric mood and sleep disturbance. The patient is not nervous/anxious.        Objective   /76   Pulse 100   Resp 12   Ht 1.676 m (5' 6\")   Wt 53.5 kg (118 lb)   LMP  (LMP Unknown)   BMI 19.05 kg/m²    Physical Exam  Constitutional:       General: She is not in acute distress.     Appearance: Normal appearance.   Cardiovascular:      Rate and Rhythm: Normal rate and regular rhythm.      Heart sounds: Normal heart sounds. No murmur heard.  Pulmonary:      Effort: Pulmonary effort is normal.      Breath sounds: Normal breath sounds.   Abdominal:      Palpations: Abdomen is soft.      Tenderness: There is no abdominal tenderness.   Neurological:      Mental Status: She is alert.   Psychiatric:         Mood and Affect: Mood normal.         Judgment: Judgment normal.           Assessment/Plan   Diagnoses and all orders for this visit:  Primary hypertension - stable on current medication, continue and monitor  Diastolic dysfunction, left ventricle/Agatston CAC score 200-399 - asymptomatic, continue Aspirin and monitor  Elevated LDL cholesterol level - well controlled on Rosuvastatin, continue  Elevated AST (SGOT)/ hx heavy alcohol use - LFTs higher, still need to schedule appt with Dr Brush  Recurrent major depressive disorder/Schizoaffective/Attention deficit disorder - stable on current meds per psychiatry  Erosive osteoarthritis of hand/Chronic pain of both knees - stable with Tylenol as needed, keep follow up with Dr Melton as planned.    Follow up in 6months, 30mins       "

## 2025-01-14 NOTE — PROGRESS NOTES
"Subjective   Patient ID: Valeria Watkins is a 69 y.o. female who presents for Follow-up.    HPI     Review of Systems    Objective   /76   Pulse 100   Resp 12   Ht 1.676 m (5' 6\")   Wt 53.5 kg (118 lb)   LMP  (LMP Unknown)   BMI 19.05 kg/m²     Physical Exam    Assessment/Plan          "

## 2025-02-10 DIAGNOSIS — Z47.1 AFTERCARE FOLLOWING BILATERAL HIP JOINT REPLACEMENT SURGERY: Primary | ICD-10-CM

## 2025-02-10 DIAGNOSIS — Z96.643 AFTERCARE FOLLOWING BILATERAL HIP JOINT REPLACEMENT SURGERY: Primary | ICD-10-CM

## 2025-02-10 RX ORDER — AMOXICILLIN 500 MG/1
2000 TABLET, FILM COATED ORAL
COMMUNITY
End: 2025-02-10 | Stop reason: SDUPTHER

## 2025-02-10 RX ORDER — AMOXICILLIN 500 MG/1
2000 TABLET, FILM COATED ORAL
Qty: 4 TABLET | Refills: 0 | Status: SHIPPED | OUTPATIENT
Start: 2025-02-10 | End: 2025-02-10

## 2025-03-27 DIAGNOSIS — J30.9 ALLERGIC RHINITIS, UNSPECIFIED SEASONALITY, UNSPECIFIED TRIGGER: ICD-10-CM

## 2025-03-31 RX ORDER — LORATADINE 10 MG/1
10 TABLET ORAL DAILY
Qty: 90 TABLET | Refills: 1 | Status: SHIPPED | OUTPATIENT
Start: 2025-03-31

## 2025-07-13 DIAGNOSIS — Z13.29 SCREENING FOR THYROID DISORDER: ICD-10-CM

## 2025-07-13 DIAGNOSIS — E78.00 ELEVATED LDL CHOLESTEROL LEVEL: Primary | ICD-10-CM

## 2025-07-16 ENCOUNTER — TELEPHONE (OUTPATIENT)
Dept: PRIMARY CARE | Facility: CLINIC | Age: 70
End: 2025-07-16
Payer: COMMERCIAL

## 2025-07-17 DIAGNOSIS — R79.89 ELEVATED LFTS: Primary | ICD-10-CM

## 2025-07-17 LAB
ALBUMIN SERPL-MCNC: 4.5 G/DL (ref 3.6–5.1)
ALP SERPL-CCNC: 68 U/L (ref 37–153)
ALT SERPL-CCNC: 37 U/L (ref 6–29)
ANION GAP SERPL CALCULATED.4IONS-SCNC: 9 MMOL/L (CALC) (ref 7–17)
AST SERPL-CCNC: 55 U/L (ref 10–35)
BILIRUB SERPL-MCNC: 0.9 MG/DL (ref 0.2–1.2)
BUN SERPL-MCNC: 17 MG/DL (ref 7–25)
CALCIUM SERPL-MCNC: 9.6 MG/DL (ref 8.6–10.4)
CHLORIDE SERPL-SCNC: 100 MMOL/L (ref 98–110)
CHOLEST SERPL-MCNC: 215 MG/DL
CHOLEST/HDLC SERPL: 2.2 (CALC)
CO2 SERPL-SCNC: 31 MMOL/L (ref 20–32)
CREAT SERPL-MCNC: 0.79 MG/DL (ref 0.6–1)
EGFRCR SERPLBLD CKD-EPI 2021: 80 ML/MIN/1.73M2
GLUCOSE SERPL-MCNC: 86 MG/DL (ref 65–99)
HDLC SERPL-MCNC: 96 MG/DL
LDLC SERPL CALC-MCNC: 104 MG/DL (CALC)
NONHDLC SERPL-MCNC: 119 MG/DL (CALC)
POTASSIUM SERPL-SCNC: 4.1 MMOL/L (ref 3.5–5.3)
PROT SERPL-MCNC: 7.2 G/DL (ref 6.1–8.1)
SODIUM SERPL-SCNC: 140 MMOL/L (ref 135–146)
TRIGL SERPL-MCNC: 66 MG/DL
TSH SERPL-ACNC: 1.23 MIU/L (ref 0.4–4.5)

## 2025-07-18 ENCOUNTER — APPOINTMENT (OUTPATIENT)
Dept: PRIMARY CARE | Facility: CLINIC | Age: 70
End: 2025-07-18
Payer: COMMERCIAL

## 2025-07-18 VITALS
DIASTOLIC BLOOD PRESSURE: 76 MMHG | HEIGHT: 66 IN | BODY MASS INDEX: 18.48 KG/M2 | WEIGHT: 115 LBS | SYSTOLIC BLOOD PRESSURE: 124 MMHG | HEART RATE: 88 BPM | RESPIRATION RATE: 12 BRPM

## 2025-07-18 DIAGNOSIS — I10 PRIMARY HYPERTENSION: ICD-10-CM

## 2025-07-18 DIAGNOSIS — Z78.0 ENCOUNTER FOR OSTEOPOROSIS SCREENING IN ASYMPTOMATIC POSTMENOPAUSAL PATIENT: ICD-10-CM

## 2025-07-18 DIAGNOSIS — Z00.00 HEALTHCARE MAINTENANCE: Primary | ICD-10-CM

## 2025-07-18 DIAGNOSIS — Z13.820 ENCOUNTER FOR OSTEOPOROSIS SCREENING IN ASYMPTOMATIC POSTMENOPAUSAL PATIENT: ICD-10-CM

## 2025-07-18 DIAGNOSIS — E78.00 ELEVATED LDL CHOLESTEROL LEVEL: ICD-10-CM

## 2025-07-18 DIAGNOSIS — Z12.31 SCREENING MAMMOGRAM FOR BREAST CANCER: ICD-10-CM

## 2025-07-18 PROCEDURE — 3008F BODY MASS INDEX DOCD: CPT | Performed by: FAMILY MEDICINE

## 2025-07-18 PROCEDURE — 1170F FXNL STATUS ASSESSED: CPT | Performed by: FAMILY MEDICINE

## 2025-07-18 PROCEDURE — G0439 PPPS, SUBSEQ VISIT: HCPCS | Performed by: FAMILY MEDICINE

## 2025-07-18 PROCEDURE — 3074F SYST BP LT 130 MM HG: CPT | Performed by: FAMILY MEDICINE

## 2025-07-18 PROCEDURE — 1159F MED LIST DOCD IN RCRD: CPT | Performed by: FAMILY MEDICINE

## 2025-07-18 PROCEDURE — 1124F ACP DISCUSS-NO DSCNMKR DOCD: CPT | Performed by: FAMILY MEDICINE

## 2025-07-18 PROCEDURE — 3078F DIAST BP <80 MM HG: CPT | Performed by: FAMILY MEDICINE

## 2025-07-18 PROCEDURE — 1160F RVW MEDS BY RX/DR IN RCRD: CPT | Performed by: FAMILY MEDICINE

## 2025-07-18 PROCEDURE — 1036F TOBACCO NON-USER: CPT | Performed by: FAMILY MEDICINE

## 2025-07-18 RX ORDER — ATENOLOL AND CHLORTHALIDONE TABLET 50; 25 MG/1; MG/1
1 TABLET ORAL DAILY
Qty: 100 TABLET | Refills: 0 | Status: SHIPPED | OUTPATIENT
Start: 2025-07-18

## 2025-07-18 RX ORDER — AMLODIPINE BESYLATE 5 MG/1
5 TABLET ORAL DAILY
Qty: 100 TABLET | Refills: 0 | Status: SHIPPED | OUTPATIENT
Start: 2025-07-18

## 2025-07-18 RX ORDER — ROSUVASTATIN CALCIUM 10 MG/1
10 TABLET, COATED ORAL DAILY
Qty: 100 TABLET | Refills: 0 | Status: SHIPPED | OUTPATIENT
Start: 2025-07-18 | End: 2026-01-14

## 2025-07-18 ASSESSMENT — ENCOUNTER SYMPTOMS
DYSURIA: 0
HEADACHES: 0
MYALGIAS: 0
SLEEP DISTURBANCE: 0
SHORTNESS OF BREATH: 0
BRUISES/BLEEDS EASILY: 0
DIFFICULTY URINATING: 0
DIARRHEA: 0
ABDOMINAL DISTENTION: 0
OCCASIONAL FEELINGS OF UNSTEADINESS: 1
ARTHRALGIAS: 0
LIGHT-HEADEDNESS: 0
CHILLS: 0
CHEST TIGHTNESS: 0
ABDOMINAL PAIN: 0
ADENOPATHY: 0
LOSS OF SENSATION IN FEET: 0
APPETITE CHANGE: 0
FEVER: 0
DYSPHORIC MOOD: 1
FATIGUE: 0
SINUS PRESSURE: 0
DEPRESSION: 1
NERVOUS/ANXIOUS: 1
WHEEZING: 0
NAUSEA: 0

## 2025-07-18 ASSESSMENT — PATIENT HEALTH QUESTIONNAIRE - PHQ9
1. LITTLE INTEREST OR PLEASURE IN DOING THINGS: MORE THAN HALF THE DAYS
7. TROUBLE CONCENTRATING ON THINGS, SUCH AS READING THE NEWSPAPER OR WATCHING TELEVISION: NOT AT ALL
8. MOVING OR SPEAKING SO SLOWLY THAT OTHER PEOPLE COULD HAVE NOTICED. OR THE OPPOSITE, BEING SO FIGETY OR RESTLESS THAT YOU HAVE BEEN MOVING AROUND A LOT MORE THAN USUAL: NOT AT ALL
SUM OF ALL RESPONSES TO PHQ QUESTIONS 1-9: 7
4. FEELING TIRED OR HAVING LITTLE ENERGY: MORE THAN HALF THE DAYS
6. FEELING BAD ABOUT YOURSELF - OR THAT YOU ARE A FAILURE OR HAVE LET YOURSELF OR YOUR FAMILY DOWN: SEVERAL DAYS
9. THOUGHTS THAT YOU WOULD BE BETTER OFF DEAD, OR OF HURTING YOURSELF: NOT AT ALL
5. POOR APPETITE OR OVEREATING: NOT AT ALL
3. TROUBLE FALLING OR STAYING ASLEEP OR SLEEPING TOO MUCH: NOT AT ALL
2. FEELING DOWN, DEPRESSED OR HOPELESS: MORE THAN HALF THE DAYS
SUM OF ALL RESPONSES TO PHQ9 QUESTIONS 1 AND 2: 4

## 2025-07-18 ASSESSMENT — ACTIVITIES OF DAILY LIVING (ADL)
TAKING_MEDICATION: INDEPENDENT
BATHING: INDEPENDENT
DRESSING: INDEPENDENT
MANAGING_FINANCES: INDEPENDENT
DOING_HOUSEWORK: INDEPENDENT
GROCERY_SHOPPING: INDEPENDENT

## 2025-07-18 NOTE — PROGRESS NOTES
"Subjective   Patient ID: Valeria Watkins is a 70 y.o. female who presents for Medicare Annual Wellness Visit Subsequent.  PMHX, PSHx, Fam hx, and Social hx reviewed.   New concerns none  Vaccines current  Dentist seen at least yearly yes  Vision concerns none  Hearing concerns none  Diet is overall healthy.   Smoker - no  Lung CT screening - NA  Alcohol use - averages  0 drinks per week, quit completely in Alcohol  Exercising 3-4 days per week.   Colonoscopy 2024, current  Mammogram 2024, due in Oct  Last PAP  NA  DEXA  2024, due in Oct  ACP - advance directives, code status, and DPOA documentation discussed         Review of Systems   Constitutional:  Negative for appetite change, chills, fatigue and fever.   HENT:  Negative for congestion, ear pain and sinus pressure.    Eyes:  Negative for visual disturbance.   Respiratory:  Negative for chest tightness, shortness of breath and wheezing.    Cardiovascular:  Negative for chest pain.   Gastrointestinal:  Negative for abdominal distention, abdominal pain, diarrhea and nausea.   Genitourinary:  Negative for difficulty urinating, dysuria and pelvic pain.   Musculoskeletal:  Negative for arthralgias and myalgias.   Skin:  Negative for rash.   Allergic/Immunologic: Negative for immunocompromised state.   Neurological:  Negative for light-headedness and headaches.   Hematological:  Negative for adenopathy. Does not bruise/bleed easily.   Psychiatric/Behavioral:  Positive for dysphoric mood. Negative for sleep disturbance. The patient is nervous/anxious.        Objective   /76   Pulse 88   Resp 12   Ht 1.676 m (5' 6\")   Wt 52.2 kg (115 lb)   LMP  (LMP Unknown)   BMI 18.56 kg/m²    Physical Exam  Constitutional:       General: She is not in acute distress.     Appearance: Normal appearance. She is not ill-appearing.   HENT:      Head: Normocephalic and atraumatic.      Right Ear: Tympanic membrane, ear canal and external ear normal. There is no impacted " cerumen.      Left Ear: Tympanic membrane, ear canal and external ear normal. There is no impacted cerumen.      Nose: Congestion present.      Mouth/Throat:      Mouth: Mucous membranes are moist.      Pharynx: No oropharyngeal exudate or posterior oropharyngeal erythema.     Eyes:      Conjunctiva/sclera: Conjunctivae normal.     Neck:      Thyroid: No thyroid mass or thyromegaly.      Vascular: No carotid bruit.     Cardiovascular:      Rate and Rhythm: Normal rate and regular rhythm.      Heart sounds: Normal heart sounds. No murmur heard.  Pulmonary:      Breath sounds: Normal breath sounds. No wheezing, rhonchi or rales.   Abdominal:      General: Bowel sounds are normal. There is no distension.      Palpations: Abdomen is soft. There is no mass.      Tenderness: There is no abdominal tenderness.     Musculoskeletal:         General: No swelling or deformity.      Cervical back: Neck supple. No tenderness.   Lymphadenopathy:      Cervical: Cervical adenopathy (R anterior ~5mm LN is mobile and chronic/unchanged per pt) present.     Skin:     General: Skin is warm and dry.      Findings: No lesion or rash.     Neurological:      Mental Status: She is alert and oriented to person, place, and time.      Sensory: No sensory deficit.      Motor: No weakness.      Coordination: Coordination normal.      Deep Tendon Reflexes: Reflexes normal.     Psychiatric:         Mood and Affect: Mood normal.         Behavior: Behavior normal.         Judgment: Judgment normal.       Medicare Wellness Billing Compliance Satisfied    *This is a visual tool to show completion of required items on the day of the visit. Green checks will only appear on the date of visit.    Review all medications by prescribing practitioner or clinical pharmacist (such as prescriptions, OTCs, herbal therapies and supplements) documented in the medical record    Past Medical, Surgical, and Family History reviewed and updated in chart    Tobacco Use  Reviewed    Alcohol Use Reviewed    Illicit Drug Use Reviewed    PHQ2/9    Falls in Last Year Reviewed    Home Safety Risk Factors Reviewed    Cognitive Impairment Reviewed    Patient Self Assessment and Health Status    Current Diet Reviewed    Exercise Frequency    ADL - Hearing Impairment    ADL - Bathing    ADL - Dressing    ADL - Walks in Home    IADL - Managing Finances    IADL - Grocery Shopping    IADL - Taking Medications    IADL - Doing Housework        Assessment/Plan   Diagnoses and all orders for this visit:  Healthcare maintenance - vaccines current. Labs reviewed and discussed. Colonoscopy current. Ordering mammogram and bone density test for October.  Elevated LFTs -discussed need for further eval with Hepatology, order is in chart. Discussed scheduling with Dr Brush being her son sees him as well.    Follow up in 3 months, 30mins

## 2025-07-18 NOTE — PROGRESS NOTES
"Subjective   Reason for Visit: Valeria Watkins is an 70 y.o. female here for a Medicare Wellness visit.     Past Medical, Surgical, and Family History reviewed and updated in chart.    Reviewed all medications by prescribing practitioner or clinical pharmacist (such as prescriptions, OTCs, herbal therapies and supplements) and documented in the medical record.    HPI    Patient Care Team:  Rik Short MD as PCP - [facility]  Yanci Maldonado MD as Consulting Physician (Hematology and Oncology)     Review of Systems    Objective   Vitals:  /76   Pulse 88   Resp 12   Ht 1.676 m (5' 6\")   Wt 52.2 kg (115 lb)   LMP  (LMP Unknown)   BMI 18.56 kg/m²       Physical Exam    Assessment & Plan              "

## 2025-08-04 ENCOUNTER — APPOINTMENT (OUTPATIENT)
Dept: RADIOLOGY | Facility: HOSPITAL | Age: 70
End: 2025-08-04
Payer: COMMERCIAL

## 2025-08-04 ENCOUNTER — HOSPITAL ENCOUNTER (EMERGENCY)
Facility: HOSPITAL | Age: 70
Discharge: HOME | End: 2025-08-04
Payer: COMMERCIAL

## 2025-08-04 VITALS
HEIGHT: 67 IN | SYSTOLIC BLOOD PRESSURE: 156 MMHG | BODY MASS INDEX: 18.83 KG/M2 | HEART RATE: 67 BPM | WEIGHT: 120 LBS | OXYGEN SATURATION: 95 % | TEMPERATURE: 96.8 F | DIASTOLIC BLOOD PRESSURE: 75 MMHG | RESPIRATION RATE: 16 BRPM

## 2025-08-04 DIAGNOSIS — S05.11XA PERIORBITAL CONTUSION OF RIGHT EYE, INITIAL ENCOUNTER: Primary | ICD-10-CM

## 2025-08-04 PROCEDURE — 99284 EMERGENCY DEPT VISIT MOD MDM: CPT | Mod: 25

## 2025-08-04 PROCEDURE — 70450 CT HEAD/BRAIN W/O DYE: CPT

## 2025-08-04 PROCEDURE — 70480 CT ORBIT/EAR/FOSSA W/O DYE: CPT

## 2025-08-04 ASSESSMENT — LIFESTYLE VARIABLES
TOTAL SCORE: 0
EVER HAD A DRINK FIRST THING IN THE MORNING TO STEADY YOUR NERVES TO GET RID OF A HANGOVER: NO
EVER FELT BAD OR GUILTY ABOUT YOUR DRINKING: NO
HAVE YOU EVER FELT YOU SHOULD CUT DOWN ON YOUR DRINKING: NO
HAVE PEOPLE ANNOYED YOU BY CRITICIZING YOUR DRINKING: NO

## 2025-08-04 ASSESSMENT — PAIN - FUNCTIONAL ASSESSMENT: PAIN_FUNCTIONAL_ASSESSMENT: 0-10

## 2025-08-04 ASSESSMENT — PAIN DESCRIPTION - PAIN TYPE: TYPE: ACUTE PAIN

## 2025-08-04 ASSESSMENT — PAIN DESCRIPTION - ORIENTATION: ORIENTATION: RIGHT

## 2025-08-04 ASSESSMENT — PAIN DESCRIPTION - LOCATION: LOCATION: FACE

## 2025-08-04 ASSESSMENT — PAIN SCALES - GENERAL: PAINLEVEL_OUTOF10: 7

## 2025-08-04 NOTE — ED PROVIDER NOTES
Chief Complaint   Patient presents with   • Facial Pain       HPI       70 year old female presents to the Emergency Department today complaining of right periorbital pain status post injury that occurred on July 25th when she hit her head on a car door. Has had an intermittent diffuse headache since that time, but that is not abnormal for her. Denies any loss of consciousness or seizure-like activity. Denies any other injuries. Denies any associated fever, chills, neck pain, chest pain, shortness of breath, abdominal pain, nausea, vomiting, diarrhea, constipation, or urinary symptoms.       History provided by:  Patient             Patient History   Medical History[1]  Surgical History[2]  Family History[3]  Social History[4]        Physical Exam  Constitutional:       Appearance: Normal appearance.   HENT:      Head: Normocephalic.      Right Ear: External ear normal.      Left Ear: External ear normal.      Ears:      Comments: Bilateral auditory canals are non-inflamed and non-reddened. Bilateral TMs are pearly gray with good light reflex. No hemotympanum or del valle signs noted.      Nose: Nose normal.      Comments: Septum midline without deviation. Turbinates are not inflamed and reddened. No septal hematoma noted.      Mouth/Throat:      Comments:  Mucous membranes are moist. All teeth are intact. Uvula midline without deviation rises upon phonation. Tonsils 1+ without exudate.     Eyes:        Comments: Bilateral conjunctiva are without injection, discharge, or drainage. PERRL with consensual pupil response bilaterally. EOM's are intact without any signs of entrapment. No hyphema or raccoon's eyes.     Cardiovascular:      Rate and Rhythm: Normal rate and regular rhythm.      Pulses:           Radial pulses are 3+ on the right side and 3+ on the left side.      Heart sounds: Normal heart sounds. No murmur heard.     No friction rub. No gallop.   Pulmonary:      Effort: Pulmonary effort is normal.      Breath  sounds: Normal breath sounds. No wheezing, rhonchi or rales.   Abdominal:      General: Abdomen is flat. Bowel sounds are normal.      Palpations: Abdomen is soft.      Tenderness: There is no right CVA tenderness, left CVA tenderness, guarding or rebound. Negative signs include Wise's sign and McBurney's sign.     Musculoskeletal:      Cervical back: Full passive range of motion without pain, normal range of motion and neck supple.      Comments: No edema, cyanosis, or clubbing noted.   Lymphadenopathy:      Cervical: No cervical adenopathy.     Skin:     Comments: No rashes, lesions, petechiae, or purpura. No signs of infection.     Neurological:      Mental Status: She is alert and oriented to person, place, and time.      Comments: Alert and oriented to person, place, or time. Follows commands well. Hand grasps and push/pulls of upper and lower extremities are equally strong bilaterally. Gait is steady and strong.    Psychiatric:         Attention and Perception: Attention normal.         Mood and Affect: Mood normal.         Speech: Speech normal.         Labs Reviewed - No data to display    CT head wo IV contrast   Final Result   NO ACUTE INTRACRANIAL PROCESS.  SKULL INTACT        NO ACUTE FRACTURE OR OTHER ACUTE TRAUMATIC INJURY TO THE ORBITAL BONES        OCULAR GLOBES AND OTHER SOFT TISSUES INTACT        THIS REPORT SERVES AS THE DIAGNOSTIC INTERPRETATION FOR TWO EXAMS   PERFORMED CONCURRENTLY: CT BRAIN WITHOUT IV CONTRAST; CT   MAXILLOFACIAL BONES WITHOUT IV CONTRAST        MACRO:   None        Signed by: Herberth Sandoval 8/4/2025 7:28 AM   Dictation workstation:   [id number]      CT orbit wo IV contrast   Final Result   NO ACUTE INTRACRANIAL PROCESS.  SKULL INTACT        NO ACUTE FRACTURE OR OTHER ACUTE TRAUMATIC INJURY TO THE ORBITAL BONES        OCULAR GLOBES AND OTHER SOFT TISSUES INTACT        THIS REPORT SERVES AS THE DIAGNOSTIC INTERPRETATION FOR TWO EXAMS   PERFORMED CONCURRENTLY: CT BRAIN WITHOUT  IV CONTRAST; CT   MAXILLOFACIAL BONES WITHOUT IV CONTRAST        MACRO:   None        Signed by: Herberth Sandoval 8/4/2025 7:28 AM   Dictation workstation:   [id number]               ED Course & MDM   Diagnoses as of 08/04/25 0802   Periorbital contusion of right eye, initial encounter           Medical Decision Making  Patient was seen and evaluated by myself. CT clifford of her head and orbits shows no acute intracranial process, skull intact, no acute fracture or other acute traumatic injury to the orbital bones, and ocular globes and other soft tissue is intact. Instructed to ice and elevate the sore area as much as possible. Take Tylenol and Advil over the counter as needed for fever and/or pain. No contraindications to NSAIDs are noted. Follow up with their doctor in 3 days. Return if worse in any way. Discharged in stable condition with computer instructions.    Diagnostic Impression:     1. Acute right periorbital contusion           Your medication list        ASK your doctor about these medications        Instructions Last Dose Given Next Dose Due   acetaminophen 650 mg ER tablet  Commonly known as: Tylenol 8 HOUR           amitriptyline 25 mg tablet  Commonly known as: Elavil           amLODIPine 5 mg tablet  Commonly known as: Norvasc      Take 1 tablet (5 mg) by mouth once daily.       aspirin 81 mg EC tablet      Take 1 tablet (81 mg) by mouth once daily.       atenoloL-chlorthalidone 50-25 mg tablet  Commonly known as: Tenoretic      Take 1 tablet by mouth once daily.       Centrum Silver Women 8 mg iron-400 mcg-50 mcg tablet  Generic drug: multivit-min-iron-FA-vit K-lut           diazePAM 5 mg tablet  Commonly known as: Valium           ferrous sulfate 325 mg (65 mg elemental) tablet           loratadine 10 mg tablet  Commonly known as: Claritin      TAKE ONE TABLET BY MOUTH ONCE DAILY.       methylphenidate 20 mg tablet  Commonly known as: Ritalin           RED BEET ORAL           rosuvastatin 10 mg  tablet  Commonly known as: Crestor      Take 1 tablet (10 mg) by mouth once daily.       Vitamin D3 25 mcg (1,000 units) tablet  Generic drug: cholecalciferol                      Procedure  Procedures         [1]  Past Medical History:  Diagnosis Date   • Abnormal weight loss 12/16/2019    Unintentional weight loss   • ADHD (attention deficit hyperactivity disorder) 8/1995   • Anxiety 1966   • Asymptomatic menopausal state 07/23/2020    Postmenopausal   • Cellulitis of left toe 11/25/2019    Cellulitis of toe of left foot   • Cellulitis of left upper limb 10/14/2021    Cellulitis of left upper extremity   • Corns and callosities 11/25/2019    Callus of foot   • Depression, unspecified 09/08/2020    Chronic depression   • Disorder of the skin and subcutaneous tissue, unspecified 12/18/2019    Skin lesion of hand   • Hypertension 2013   • Liver disease, unspecified 11/13/2022    Hepatic lesion   • Muscle spasm of back 07/24/2019    Lumbar paraspinal muscle spasm   • Osteoporosis 2017   • Other allergy status, other than to drugs and biological substances 01/08/2018    History of environmental allergies   • Pain in left arm 08/11/2021    Pain of left upper extremity   • Pain in left hip     Left hip pain   • Pain in right hip 04/12/2017    Right hip pain   • Pain in right hip 04/13/2018    Right hip pain   • Pain in right hip 05/02/2019    Hip pain, chronic, right   • Pain in unspecified hip 09/23/2020    Pain, hip   • Pain in unspecified joint 01/16/2020    Multiple joint pain   • Pain in unspecified knee 06/22/2018    Knee pain   • Personal history of diseases of the blood and blood-forming organs and certain disorders involving the immune mechanism 05/02/2022    History of anemia   • Personal history of diseases of the skin and subcutaneous tissue 09/21/2014    History of allergic urticaria   • Personal history of diseases of the skin and subcutaneous tissue 07/15/2016    History of eczema   • Personal history of  other diseases of the circulatory system 03/01/2021    History of abnormal electrocardiography   • Personal history of other diseases of the female genital tract 07/23/2020    History of vaginal discharge   • Personal history of other diseases of the female genital tract 01/15/2016    History of postmenopausal bleeding   • Personal history of other diseases of the female genital tract 09/17/2015    History of vaginal bleeding   • Personal history of other drug therapy 09/08/2020    History of pneumococcal vaccination   • Personal history of other drug therapy 09/08/2020    History of influenza vaccination   • Personal history of other infectious and parasitic diseases 09/01/2021    History of candidiasis of vagina   • Personal history of other specified conditions 02/23/2021    History of cachexia   • Rash and other nonspecific skin eruption 11/25/2019    Rash, skin   • Unilateral primary osteoarthritis, left hip 10/13/2020    Localized osteoarthrosis of left hip   • Unilateral primary osteoarthritis, right hip 07/26/2017    Primary osteoarthritis of right hip   • Unspecified acquired deformity of left lower leg 01/11/2019    Deformity of left foot   • Unspecified acquired deformity of right lower leg 01/11/2019    Foot deformity, bilateral   • Unspecified symptoms and signs involving the genitourinary system 10/19/2020    UTI symptoms   [2]  Past Surgical History:  Procedure Laterality Date   • BREAST SURGERY Left 1979    Benign   • OTHER SURGICAL HISTORY  07/23/2020    Lumpectomy   • OTHER SURGICAL HISTORY Bilateral 07/23/2020    Hip surgery   • OTHER SURGICAL HISTORY  07/23/2020    Dilation and curettage   [3]  Family History  Problem Relation Name Age of Onset   • Heart murmur Mother Danielle Watkins    • Hypertension Mother Danielle Watkins    • Hypertension Father Raciel Watkins    • Ovarian cancer Sister     • Throat cancer Daughter     • Ulcerative colitis Son     • Diabetes Sister Cinthia  Fish    [4]  Social History  Tobacco Use   • Smoking status: Never   • Smokeless tobacco: Never   Vaping Use   • Vaping status: Never Used   Substance Use Topics   • Alcohol use: Not Currently     Comment: OCCASIONAL   • Drug use: Never      JITENDRA Wheatley-MARK  08/04/25 0802

## 2025-08-04 NOTE — ED TRIAGE NOTES
Pt. Arrived to the ED via private car for c/o bursing. Per pt. She hit her right cheek on the car door on July 25th and since then has had bursing and swelling. Pt salena any blurry or double vision. Pt. Mis not taking any blood thinners.

## 2025-08-18 ENCOUNTER — APPOINTMENT (OUTPATIENT)
Dept: PRIMARY CARE | Facility: CLINIC | Age: 70
End: 2025-08-18
Payer: COMMERCIAL

## 2025-08-18 VITALS
DIASTOLIC BLOOD PRESSURE: 70 MMHG | SYSTOLIC BLOOD PRESSURE: 128 MMHG | BODY MASS INDEX: 17.36 KG/M2 | HEART RATE: 76 BPM | HEIGHT: 66 IN | WEIGHT: 108 LBS | RESPIRATION RATE: 16 BRPM

## 2025-08-18 DIAGNOSIS — S09.90XD CLOSED HEAD INJURY, SUBSEQUENT ENCOUNTER: Primary | ICD-10-CM

## 2025-08-18 PROBLEM — S09.90XA CLOSED HEAD INJURY: Status: ACTIVE | Noted: 2025-08-18

## 2025-08-18 PROCEDURE — 99214 OFFICE O/P EST MOD 30 MIN: CPT | Performed by: FAMILY MEDICINE

## 2025-08-18 PROCEDURE — 1159F MED LIST DOCD IN RCRD: CPT | Performed by: FAMILY MEDICINE

## 2025-08-18 PROCEDURE — 3008F BODY MASS INDEX DOCD: CPT | Performed by: FAMILY MEDICINE

## 2025-08-18 PROCEDURE — 3074F SYST BP LT 130 MM HG: CPT | Performed by: FAMILY MEDICINE

## 2025-08-18 PROCEDURE — 3078F DIAST BP <80 MM HG: CPT | Performed by: FAMILY MEDICINE

## 2025-08-18 PROCEDURE — 1036F TOBACCO NON-USER: CPT | Performed by: FAMILY MEDICINE

## 2025-08-18 ASSESSMENT — ENCOUNTER SYMPTOMS
FATIGUE: 0
NAUSEA: 0
CHEST TIGHTNESS: 0
NERVOUS/ANXIOUS: 0
WHEEZING: 0
ARTHRALGIAS: 0
DIARRHEA: 0
MYALGIAS: 0
SLEEP DISTURBANCE: 0
LIGHT-HEADEDNESS: 0
DIFFICULTY URINATING: 0
ABDOMINAL DISTENTION: 0
SINUS PRESSURE: 0
DYSURIA: 0
BRUISES/BLEEDS EASILY: 0
SHORTNESS OF BREATH: 0
HEADACHES: 0
FEVER: 0
DYSPHORIC MOOD: 0
CHILLS: 0
APPETITE CHANGE: 0
ABDOMINAL PAIN: 0
ADENOPATHY: 0

## 2025-10-13 ENCOUNTER — APPOINTMENT (OUTPATIENT)
Dept: RADIOLOGY | Facility: CLINIC | Age: 70
End: 2025-10-13
Payer: COMMERCIAL

## 2025-10-21 ENCOUNTER — APPOINTMENT (OUTPATIENT)
Dept: PRIMARY CARE | Facility: CLINIC | Age: 70
End: 2025-10-21
Payer: COMMERCIAL